# Patient Record
Sex: FEMALE | Race: WHITE | Employment: OTHER | URBAN - METROPOLITAN AREA
[De-identification: names, ages, dates, MRNs, and addresses within clinical notes are randomized per-mention and may not be internally consistent; named-entity substitution may affect disease eponyms.]

---

## 2017-01-10 ENCOUNTER — APPOINTMENT (OUTPATIENT)
Dept: INFUSION THERAPY | Age: 67
End: 2017-01-10

## 2017-01-10 RX ORDER — TRAZODONE HYDROCHLORIDE 50 MG/1
100 TABLET ORAL
Qty: 180 TAB | Refills: 1 | Status: SHIPPED | OUTPATIENT
Start: 2017-01-10 | End: 2017-05-15 | Stop reason: SDUPTHER

## 2017-01-11 ENCOUNTER — TELEPHONE (OUTPATIENT)
Dept: INTERNAL MEDICINE CLINIC | Age: 67
End: 2017-01-11

## 2017-01-11 DIAGNOSIS — Z23 ENCOUNTER FOR IMMUNIZATION: Primary | ICD-10-CM

## 2017-01-11 NOTE — TELEPHONE ENCOUNTER
Pt was scheduled today at 1:30 pm for a nurse visit to get her Shingles Vaccine. Pt now want's to have  send an order for the Shingles Vaccine sent to the [facility]  on [address] [facility] and [address] [place] [address] Road there # [phone].

## 2017-01-11 NOTE — TELEPHONE ENCOUNTER
Rx eRx'd for shingles vaccine to pharmacy requested. Though, no Rx should be needed to get the vaccine.

## 2017-01-30 ENCOUNTER — OFFICE VISIT (OUTPATIENT)
Dept: INTERNAL MEDICINE CLINIC | Age: 67
End: 2017-01-30

## 2017-01-30 VITALS
WEIGHT: 146.6 LBS | TEMPERATURE: 98.1 F | RESPIRATION RATE: 16 BRPM | HEIGHT: 60 IN | BODY MASS INDEX: 28.78 KG/M2 | DIASTOLIC BLOOD PRESSURE: 63 MMHG | SYSTOLIC BLOOD PRESSURE: 130 MMHG | HEART RATE: 70 BPM

## 2017-01-30 DIAGNOSIS — J30.2 OTHER SEASONAL ALLERGIC RHINITIS: ICD-10-CM

## 2017-01-30 DIAGNOSIS — M81.0 OSTEOPOROSIS: ICD-10-CM

## 2017-01-30 DIAGNOSIS — J32.9 SINUSITIS, UNSPECIFIED CHRONICITY, UNSPECIFIED LOCATION: Primary | ICD-10-CM

## 2017-01-30 DIAGNOSIS — I10 ESSENTIAL HYPERTENSION: ICD-10-CM

## 2017-01-30 DIAGNOSIS — E78.5 HYPERLIPIDEMIA, UNSPECIFIED HYPERLIPIDEMIA TYPE: ICD-10-CM

## 2017-01-30 DIAGNOSIS — E03.4 HYPOTHYROIDISM DUE TO ACQUIRED ATROPHY OF THYROID: ICD-10-CM

## 2017-01-30 RX ORDER — AZITHROMYCIN 250 MG/1
TABLET, FILM COATED ORAL
Qty: 6 TAB | Refills: 0 | Status: SHIPPED | OUTPATIENT
Start: 2017-01-30 | End: 2017-02-03

## 2017-01-30 NOTE — MR AVS SNAPSHOT
Visit Information Date & Time Provider Department Dept. Phone Encounter #  
 1/30/2017  1:45 PM Re[facility]rett [facility] [phone] [id number] Follow-up Instructions Return in about 4 months (around 5/30/2017), or if symptoms worsen or fail to improve, for blood pressure, cholesterol. Your Appointments 2/3/2017 10:50 AM  
New Patient with MD Zeeshan [facility]) Appt Note: NP/ Thyroids/referred by Dr Marissa Bolanos [phone]/SX made appt; r/s $0CP $0PB msb 11/28/16  
 [address]  [address] [place] [address]  
[id number] [place] [id number] Upcoming Health Maintenance Date Due ZOSTER VACCINE AGE 60> 11/22/2010 Pneumococcal 65+ Low/Medium Risk (2 of 2 - PPSV23) 12/18/2016 MEDICARE YEARLY EXAM 1/7/2017 BREAST CANCER SCRN MAMMOGRAM 3/18/2018 GLAUCOMA SCREENING Q2Y 6/1/2018 COLONOSCOPY 5/5/2021 DTaP/Tdap/Td series (2 - Td) 12/18/2025 Allergies as of 1/30/2017  Review Complete On: 1/30/2017 By: Meg Barrett MD  
  
 Severity Noted Reaction Type Reactions Mold  12/17/2015    Sneezing Current Immunizations  Reviewed on 11/17/2016 Name Date Pneumococcal Conjugate (PCV-13) 12/18/2015 Tdap 12/18/2015 Not reviewed this visit You Were Diagnosed With   
  
 Codes Comments Sinusitis, unspecified chronicity, unspecified location    -  Primary ICD-10-CM: J32.9 ICD-9-CM: 473.9 Other seasonal allergic rhinitis     ICD-10-CM: J30.2 ICD-9-CM: 477.8 Essential hypertension     ICD-10-CM: I10 
ICD-9-CM: 401.9 Hyperlipidemia, unspecified hyperlipidemia type     ICD-10-CM: E78.5 ICD-9-CM: 272.4 Hypothyroidism due to acquired atrophy of thyroid     ICD-10-CM: E03.4 ICD-9-CM: 244.8, 246.8 Osteoporosis     ICD-10-CM: M81.0 ICD-9-CM: 733.00 Vitals BP Pulse Temp Resp Height(growth percentile) Weight(growth percentile) 130/63 70 98.1 °F (36.7 °C) (Oral) 16 5' (1.524 m) 146 lb 9.6 oz (66.5 kg) BMI OB Status Smoking Status 28.63 kg/m2 Postmenopausal Never Smoker BMI and BSA Data Body Mass Index Body Surface Area  
 28.63 kg/m 2 1.68 m 2 Preferred Pharmacy Pharmacy Name Phone [facility] [address] [phone] Your Updated Medication List  
  
   
This list is accurate as of: 1/30/17  2:26 PM.  Always use your most recent med list. amLODIPine 10 mg tablet Commonly known as:  Love [place]ach TAKE 1 TABLET EVERY DAY  
  
 aspirin delayed-release 81 mg tablet Take 1 Tab by mouth daily. azelastine 137 mcg (0.1 %) nasal spray Commonly known as:  ASTELIN  
2 Sprays by Both Nostrils route two (2) times a day. Use in each nostril as directed  
  
 azithromycin 250 mg tablet Commonly known as:  Idelia Fines Take 2 tablets today, then take 1 tablet daily  
  
 biotin 2,500 mcg Tab Take  by mouth daily. CALCIUM + D PO Take  by mouth. cetirizine 10 mg tablet Commonly known as:  ZYRTEC Take 1 Tab by mouth daily as needed for Allergies. cyanocobalamin 1,000 mcg tablet Take 1,000 mcg by mouth daily. cycloSPORINE 0.05 % ophthalmic emulsion Commonly known as:  RESTASIS Administer 1 Drop to both eyes two (2) times a day. esomeprazole 20 mg capsule Commonly known as:  NEXIUM  
TAKE 1 CAPSULE EVERY DAY AS NEEDED  
  
 fenofibrate nanocrystallized 145 mg tablet Commonly known as:  TRICOR  
TAKE 1 TABLET DAILY FOR HYPERLIPIDEMIA  
  
 fluticasone 50 mcg/actuation nasal spray Commonly known as:  FLONASE  
USE 2 SPRAYS IN EACH NOSTRIL EVERY DAY  
  
 ibandronate 150 mg tablet Commonly known as:  Tatyana Keita Take 150 mg by mouth every thirty (30) days. * levothyroxine 88 mcg tablet Commonly known as:  SYNTHROID  
 Take 1 Tab by mouth Daily (before breakfast). * levothyroxine 88 mcg tablet Commonly known as:  SYNTHROID Take 1 Tab by mouth Daily (before breakfast). linaclotide 145 mcg Cap capsule Commonly known as:  Rhonda Friendship Take 1 Cap by mouth Daily (before breakfast). lisinopril 20 mg tablet Commonly known as:  PRINIVIL, ZESTRIL  
TAKE 1 TABLET EVERY DAY  
  
 multivit-min-folic acid-biotin 37.1-2,915 mcg Tab Take  by mouth.  
  
 multivitamin tablet Commonly known as:  ONE A DAY Take 1 Tab by mouth daily. rosuvastatin 20 mg tablet Commonly known as:  CRESTOR  
TAKE 1 TABLET EVERY NIGHT  
  
 sertraline 100 mg tablet Commonly known as:  ZOLOFT  
TAKE 1 TABLET EVERY DAY  
  
 traZODone 50 mg tablet Commonly known as:  Nidhi Boston Take 2 Tabs by mouth nightly. zolpidem 5 mg tablet Commonly known as:  AMBIEN Take 1 Tab by mouth nightly as needed for Sleep. Max Daily Amount: 5 mg.  
  
 * Notice: This list has 2 medication(s) that are the same as other medications prescribed for you. Read the directions carefully, and ask your doctor or other care provider to review them with you. Prescriptions Sent to Pharmacy Refills  
 azithromycin (ZITHROMAX) 250 mg tablet 0 Sig: Take 2 tablets today, then take 1 tablet daily Class: Normal  
 Pharmacy: [facility] [place], [place] [facility] [address] [facility] #: [phone] Follow-up Instructions Return in about 4 months (around 5/30/2017), or if symptoms worsen or fail to improve, for blood pressure, cholesterol. Patient Instructions Andria Nicholson 1724 What is a living will? A living will is a legal form you use to write down the kind of care you want at the end of your life. It is used by the health professionals who will treat you if you aren't able to decide for yourself.  
If you put your wishes in writing, your loved ones and others will know what kind of care you want. They won't need to guess. This can ease your mind and be helpful to others. A living will is not the same as an estate or property will. An estate will explains what you want to happen with your money and property after you die. Is a living will a legal document? A living will is a legal document. Each state has its own laws about living monk. If you move to another state, make sure that your living will is legal in the state where you now live. Or you might use a universal form that has been approved by many states. This kind of form can sometimes be completed and stored online. Your electronic copy will then be available wherever you have a connection to the Internet. In most cases, doctors will respect your wishes even if you have a form from a different state. · You don't need an  to complete a living will. But legal advice can be helpful if your state's laws are unclear, your health history is complicated, or your family can't agree on what should be in your living will. · You can change your living will at any time. Some people find that their wishes about end-of-life care change as their health changes. · In addition to making a living will, think about completing a medical power of  form. This form lets you name the person you want to make end-of-life treatment decisions for you (your \"health care agent\") if you're not able to. Many hospitals and nursing homes will give you the forms you need to complete a living will and a medical power of . · Your living will is used only if you can't make or communicate decisions for yourself anymore. If you become able to make decisions again, you can accept or refuse any treatment, no matter what you wrote in your living will. · Your state may offer an online registry. This is a place where you can store your living will online so the doctors and nurses who need to treat you can find it right away. What should you think about when creating a living will? Talk about your end-of-life wishes with your family members and your doctor. Let them know what you want. That way the people making decisions for you won't be surprised by your choices. Think about these questions as you make your living will: · Do you know enough about life support methods that might be used? If not, talk to your doctor so you know what might be done if you can't breathe on your own, your heart stops, or you're unable to swallow. · What things would you still want to be able to do after you receive life-support methods? Would you want to be able to walk? To speak? To eat on your own? To live without the help of machines? · If you have a choice, where do you want to be cared for? In your home? At a hospital or nursing home? · Do you want certain Mu-ism practices performed if you become very ill? · If you have a choice at the end of your life, where would you prefer to die? At home? In a hospital or nursing home? Somewhere else? · Would you prefer to be buried or cremated? · Do you want your organs to be donated after you die? What should you do with your living will? · Make sure that your family members and your health care agent have copies of your living will. · Give your doctor a copy of your living will to keep in your medical record. If you have more than one doctor, make sure that each one has a copy. · You may want to put a copy of your living will where it can be easily found. Where can you learn more? Go to http://shivam-shekhar.info/. Enter G670 in the search box to learn more about \"Learning About Living Perroroz. \" Current as of: February 24, 2016 Content Version: 11.1 © 0991-0121 [employer]. Care instructions adapted under license by "[employer]" (which disclaims liability or warranty for this information).  If you have questions about a medical condition or this instruction, always ask your healthcare professional. [place] [address] any warranty or liability for your use of this information. Introducing [facility]! Tiarra Ashton introduces [employer] patient portal. Now you can access parts of your medical record, email your doctor's office, and request medication refills online. 1. In your internet browser, go to [email or web address]://Day [email or web address]. Bycler/[employer] 2. Click on the First Time User? Click Here link in the Sign In box. You will see the New Member Sign Up page. 3. Enter your [employer] Access Code exactly as it appears below. You will not need to use this code after youve completed the sign-up process. If you do not sign up before the expiration date, you must request a new code. · [employer] Access Code: [email or web address] Expires: 3/21/2017  1:19 PM 
 
4. Enter the last four digits of your Social Security Number (xxxx) and Date of Birth (mm/dd/yyyy) as indicated and click Submit. You will be taken to the next sign-up page. 5. Create a [employer] ID. This will be your [employer] login ID and cannot be changed, so think of one that is secure and easy to remember. 6. Create a [employer] password. You can change your password at any time. 7. Enter your Password Reset Question and Answer. This can be used at a later time if you forget your password. 8. Enter your e-mail address. You will receive e-mail notification when new information is available in [address] 9. Click Sign Up. You can now view and download portions of your medical record. 10. Click the Download Summary menu link to download a portable copy of your medical information. If you have questions, please visit the Frequently Asked Questions section of the [employer] website. Remember, [employer] is NOT to be used for urgent needs. For medical emergencies, dial 911. Now available from your iPhone and Android! Please provide this summary of care documentation to your next provider. Your primary care clinician is listed as [address] If you have any questions after today's visit, please call [phone].

## 2017-01-30 NOTE — PROGRESS NOTES
RM 14  Pt used OTC meds and helped fro awhile then came back. Pt has appt. With allergist on 2/7/17  Pt has appt with endo on 2/3/17    Chief Complaint   Patient presents with    Sinus Pain     nasal congestion, right ear pain, slight cough x 3 weeks       1. Have you been to the ER, urgent care clinic since your last visit? Hospitalized since your last visit? No    2. Have you seen or consulted any other health care providers outside of the [address] [place] [address] since your last visit? Include any pap smears or colon screening.  No      Health Maintenance Due   Topic Date Due    ZOSTER VACCINE AGE 60>  11/22/2010    Pneumococcal 65+ Low/Medium Risk (2 of 2 - PPSV23) 12/18/2016    MEDICARE YEARLY EXAM  01/07/2017     Living will sent to pt AVS

## 2017-01-30 NOTE — PROGRESS NOTES
HISTORY OF PRESENT ILLNESS  Ephraim Reed is a 77 y.o. female. HPI  Presents for f/u nasal, sinus sx, etc.    Waxing and waning sinus congestion, drainage  +chills over the weekend with subjective fevers  Thickened secretions  Increased ear and jaw pain    Using zyrtec  Not using flonase - reports more drainage with it, so does not take it regularly    Past medical, Social, and Family history reviewed  Medications reviewed and updated. ROS  Complete ROS reviewed and negative or stable except as noted in HPI    Physical Exam   Constitutional: She is oriented to person, place, and time. She appears well-nourished. No distress. HENT:   Head: Normocephalic and atraumatic. Right Ear: A middle ear effusion (serous) is present. Left Ear: A middle ear effusion (serous) is present. Nose: Mucosal edema present. Right sinus exhibits maxillary sinus tenderness. Left sinus exhibits maxillary sinus tenderness. Mouth/Throat: Oropharynx is clear and moist.   Eyes: EOM are normal. Pupils are equal, round, and reactive to light. No scleral icterus. Neck: Normal range of motion. Neck supple. No JVD present. Cardiovascular: Normal rate, regular rhythm and normal heart sounds. Exam reveals no gallop and no friction rub. No murmur heard. Pulmonary/Chest: Effort normal and breath sounds normal. No respiratory distress. She has no wheezes. She has no rales. Abdominal: Soft. She exhibits no distension. There is no tenderness. Musculoskeletal: Normal range of motion. She exhibits no edema. Lymphadenopathy:     She has no cervical adenopathy. Neurological: She is alert and oriented to person, place, and time. She exhibits normal muscle tone. Skin: Skin is warm. No rash noted. Psychiatric: She has a normal mood and affect. Nursing note and vitals reviewed. Prior labs reviewed. ASSESSMENT and PLAN    ICD-10-CM ICD-9-CM    1.  Sinusitis, unspecified chronicity, unspecified location J32.9 473.9 DISCONTINUED: azithromycin (ZITHROMAX) 250 mg tablet   2. Other seasonal allergic rhinitis J30.2 477.8    3. Essential hypertension I10 401.9    4. Hyperlipidemia, unspecified hyperlipidemia type E78.5 272.4    5. Hypothyroidism due to acquired atrophy of thyroid E03.4 244.8      246.8    6. Osteoporosis M81.0 733.00      Follow-up Disposition:  Return in about 4 months (around 5/30/2017), or if symptoms worsen or fail to improve, for blood pressure, cholesterol.    results and schedule of future studies reviewed with patient  reviewed diet, exercise and weight  cardiovascular risk and specific lipid/LDL goals reviewed  reviewed medications and side effects in detail   See allergist next week  See endocrine 2/3/17 as scheduled  kari  Must hold antihistamines for allergy testing

## 2017-01-30 NOTE — PATIENT INSTRUCTIONS
Learning About Philip Arce  What is a living will? A living will is a legal form you use to write down the kind of care you want at the end of your life. It is used by the health professionals who will treat you if you aren't able to decide for yourself. If you put your wishes in writing, your loved ones and others will know what kind of care you want. They won't need to guess. This can ease your mind and be helpful to others. A living will is not the same as an estate or property will. An estate will explains what you want to happen with your money and property after you die. Is a living will a legal document? A living will is a legal document. Each state has its own laws about living monk. If you move to another state, make sure that your living will is legal in the state where you now live. Or you might use a universal form that has been approved by many states. This kind of form can sometimes be completed and stored online. Your electronic copy will then be available wherever you have a connection to the Internet. In most cases, doctors will respect your wishes even if you have a form from a different state. · You don't need an  to complete a living will. But legal advice can be helpful if your state's laws are unclear, your health history is complicated, or your family can't agree on what should be in your living will. · You can change your living will at any time. Some people find that their wishes about end-of-life care change as their health changes. · In addition to making a living will, think about completing a medical power of  form. This form lets you name the person you want to make end-of-life treatment decisions for you (your \"health care agent\") if you're not able to. Many hospitals and nursing homes will give you the forms you need to complete a living will and a medical power of .   · Your living will is used only if you can't make or communicate decisions for yourself anymore. If you become able to make decisions again, you can accept or refuse any treatment, no matter what you wrote in your living will. · Your state may offer an online registry. This is a place where you can store your living will online so the doctors and nurses who need to treat you can find it right away. What should you think about when creating a living will? Talk about your end-of-life wishes with your family members and your doctor. Let them know what you want. That way the people making decisions for you won't be surprised by your choices. Think about these questions as you make your living will:  · Do you know enough about life support methods that might be used? If not, talk to your doctor so you know what might be done if you can't breathe on your own, your heart stops, or you're unable to swallow. · What things would you still want to be able to do after you receive life-support methods? Would you want to be able to walk? To speak? To eat on your own? To live without the help of machines? · If you have a choice, where do you want to be cared for? In your home? At a hospital or nursing home? · Do you want certain Uatsdin practices performed if you become very ill? · If you have a choice at the end of your life, where would you prefer to die? At home? In a hospital or nursing home? Somewhere else? · Would you prefer to be buried or cremated? · Do you want your organs to be donated after you die? What should you do with your living will? · Make sure that your family members and your health care agent have copies of your living will. · Give your doctor a copy of your living will to keep in your medical record. If you have more than one doctor, make sure that each one has a copy. · You may want to put a copy of your living will where it can be easily found. Where can you learn more? Go to http://shivam-shekhar.info/.   Enter L281 in the search box to learn more about \"Learning About Living [place]roy. \"  Current as of: February 24, 2016  Content Version: 11.1  © 5432-6880 [employer]. Care instructions adapted under license by [employer] (which disclaims liability or warranty for this information). If you have questions about a medical condition or this instruction, always ask your healthcare professional. [address] any warranty or liability for your use of this information.

## 2017-02-01 ENCOUNTER — APPOINTMENT (OUTPATIENT)
Dept: INFUSION THERAPY | Age: 67
End: 2017-02-01

## 2017-02-03 ENCOUNTER — OFFICE VISIT (OUTPATIENT)
Dept: ENDOCRINOLOGY | Age: 67
End: 2017-02-03

## 2017-02-03 VITALS
BODY MASS INDEX: 27.88 KG/M2 | HEIGHT: 60 IN | WEIGHT: 142 LBS | SYSTOLIC BLOOD PRESSURE: 120 MMHG | HEART RATE: 68 BPM | DIASTOLIC BLOOD PRESSURE: 70 MMHG

## 2017-02-03 DIAGNOSIS — E05.00 GRAVES DISEASE: ICD-10-CM

## 2017-02-03 DIAGNOSIS — E89.0 HYPOTHYROIDISM FOLLOWING RADIOIODINE THERAPY: Primary | ICD-10-CM

## 2017-02-03 NOTE — MR AVS SNAPSHOT
Visit Information Date & Time Provider Department Dept. Phone Encounter #  
 2/3/2017 10:50 AM Jag Yi, [address] [facility] [phone] [id number] Follow-up Instructions Return in about 3 months (around 5/3/2017). Your Appointments 5/30/2017  1:30 PM  
ROUTINE CARE with Fidelia Kellogg MD  
[facility] [address] [facility]) Appt Note: f/u for b/p and cholesterol [address] [facility] [facility] [address] [facility] [address]  
[place] [address] [facility] [address] Upcoming Health Maintenance Date Due ZOSTER VACCINE AGE 60> 11/22/2010 Pneumococcal 65+ Low/Medium Risk (2 of 2 - PPSV23) 12/18/2016 MEDICARE YEARLY EXAM 1/7/2017 BREAST CANCER SCRN MAMMOGRAM 3/18/2018 GLAUCOMA SCREENING Q2Y 6/1/2018 COLONOSCOPY 5/5/2021 DTaP/Tdap/Td series (2 - Td) 12/18/2025 Allergies as of 2/3/2017  Review Complete On: 2/3/2017 By: Jga Yi MD  
  
 Severity Noted Reaction Type Reactions Mold  12/17/2015    Sneezing Current Immunizations  Reviewed on 1/30/2017 Name Date Influenza Vaccine 1/11/2017 Pneumococcal Conjugate (PCV-13) 12/18/2015 Tdap 12/18/2015 Not reviewed this visit You Were Diagnosed With   
  
 Codes Comments Hypothyroidism following radioiodine therapy    -  Primary ICD-10-CM: E89.0 ICD-9-CM: 244.1 Graves disease     ICD-10-CM: E05.00 ICD-9-CM: 242.00 Vitals BP Pulse Height(growth percentile) Weight(growth percentile) BMI OB Status 120/70 68 5' (1.524 m) 142 lb (64.4 kg) 27.73 kg/m2 Postmenopausal  
 Smoking Status Never Smoker Vitals History BMI and BSA Data Body Mass Index Body Surface Area  
 27.73 kg/m 2 1.65 m 2 Preferred Pharmacy Pharmacy Name Phone [address] - [address] [facility] [address] [phone] Your Updated Medication List  
  
   
This list is accurate as of: 2/3/17 12:13 PM.  Always use your most recent med list. amLODIPine 10 mg tablet Commonly known as:  Maura Sharon TAKE 1 TABLET EVERY DAY  
  
 aspirin delayed-release 81 mg tablet Take 1 Tab by mouth daily. azelastine 137 mcg (0.1 %) nasal spray Commonly known as:  ASTELIN  
2 Sprays by Both Nostrils route two (2) times a day. Use in each nostril as directed  
  
 azithromycin 250 mg tablet Commonly known as:  Mily Quincy Take 2 tablets today, then take 1 tablet daily  
  
 biotin 2,500 mcg Tab Take 5,000 Units by mouth daily. CALCIUM + D PO Take  by mouth. cetirizine 10 mg tablet Commonly known as:  ZYRTEC Take 1 Tab by mouth daily as needed for Allergies. cyanocobalamin 1,000 mcg tablet Take 1,000 mcg by mouth daily. cycloSPORINE 0.05 % ophthalmic emulsion Commonly known as:  RESTASIS Administer 1 Drop to both eyes two (2) times a day. esomeprazole 20 mg capsule Commonly known as:  NEXIUM  
TAKE 1 CAPSULE EVERY DAY AS NEEDED  
  
 fenofibrate nanocrystallized 145 mg tablet Commonly known as:  TRICOR  
TAKE 1 TABLET DAILY FOR HYPERLIPIDEMIA  
  
 fluticasone 50 mcg/actuation nasal spray Commonly known as:  FLONASE  
USE 2 SPRAYS IN EACH NOSTRIL EVERY DAY  
  
 ibandronate 150 mg tablet Commonly known as:  Terrilyn Newaygo Take 150 mg by mouth every thirty (30) days. * levothyroxine 88 mcg tablet Commonly known as:  SYNTHROID Take 1 Tab by mouth Daily (before breakfast). * levothyroxine 88 mcg tablet Commonly known as:  SYNTHROID Take 1 Tab by mouth Daily (before breakfast). linaclotide 145 mcg Cap capsule Commonly known as:  Rhonda Greenbush Take 1 Cap by mouth Daily (before breakfast). lisinopril 20 mg tablet Commonly known as:  PRINIVIL, ZESTRIL  
TAKE 1 TABLET EVERY DAY  
  
 multivit-min-folic acid-biotin 12.7-8,352 mcg Tab Take  by mouth. multivitamin tablet Commonly known as:  ONE A DAY Take 1 Tab by mouth daily. rosuvastatin 20 mg tablet Commonly known as:  CRESTOR  
TAKE 1 TABLET EVERY NIGHT  
  
 sertraline 100 mg tablet Commonly known as:  ZOLOFT  
TAKE 1 TABLET EVERY DAY  
  
 traZODone 50 mg tablet Commonly known as:  Windsor Dessert Take 2 Tabs by mouth nightly. zolpidem 5 mg tablet Commonly known as:  AMBIEN Take 1 Tab by mouth nightly as needed for Sleep. Max Daily Amount: 5 mg.  
  
 * Notice: This list has 2 medication(s) that are the same as other medications prescribed for you. Read the directions carefully, and ask your doctor or other care provider to review them with you. We Performed the Following T4, FREE [id number] CPT(R)] THYROID PEROXIDASE (TPO) AB [26418 CPT(R)] TSH 3RD GENERATION [42301 CPT(R)] TSH RECEPTOR AB [31670 CPT(R)] Follow-up Instructions Return in about 3 months (around 5/3/2017). Patient Instructions Hypothyroidism following radioactive iodine: 
 
- labs on 88 mcg 
- adjust based on labs. Will also check antibodies. After any adjustments, we'll reassess labs in 3 months. Introducing [facility]! Mariam Pemberton introduces [employer] patient portal. Now you can access parts of your medical record, email your doctor's office, and request medication refills online. 1. In your internet browser, go to [email or web address] 2. Click on the First Time User? Click Here link in the Sign In box. You will see the New Member Sign Up page. 3. Enter your [employer] Access Code exactly as it appears below. You will not need to use this code after youve completed the sign-up process. If you do not sign up before the expiration date, you must request a new code. · [employer] Access Code: [email or web address] Expires: 3/21/2017  1:19 PM 
 
4.  Enter the last four digits of your Social Security Number (xxxx) and Date of Birth (mm/dd/yyyy) as indicated and click Submit. You will be taken to the next sign-up page. 5. Create a [employer] ID. This will be your [employer] login ID and cannot be changed, so think of one that is secure and easy to remember. 6. Create a [employer] password. You can change your password at any time. 7. Enter your Password Reset Question and Answer. This can be used at a later time if you forget your password. 8. Enter your e-mail address. You will receive e-mail notification when new information is available in [address] 9. Click Sign Up. You can now view and download portions of your medical record. 10. Click the Download Summary menu link to download a portable copy of your medical information. If you have questions, please visit the Frequently Asked Questions section of the [employer] website. Remember, [employer] is NOT to be used for urgent needs. For medical emergencies, dial 911. Now available from your iPhone and Android! Please provide this summary of care documentation to your next provider. Your primary care clinician is listed as [address] If you have any questions after today's visit, please call [phone].

## 2017-02-03 NOTE — PATIENT INSTRUCTIONS
Hypothyroidism following radioactive iodine:    - labs on 88 mcg  - adjust based on labs. Will also check antibodies. After any adjustments, we'll reassess labs in 3 months.

## 2017-02-03 NOTE — PROGRESS NOTES
Chief Complaint   Patient presents with    Thyroid Problem    New Patient     History of Present Illness: Stacey Cockayne is a 77 y.o. female who presents for evaluation of hypothyroidism following METCALF treatment for Graves. She had two treatments with METCALF in late  for Graves disease. She was then treated with Synthroid 100 mcg. Now been taking 112 mcg daily for several years. Started having hair changes and feeling tired as well. TSH was low in 2016 and dose was decreased to 88 mcg. Sleep has been poor for some time, which she attributes to stress. She has chronic dry eyes. Uses restasis    Social:  Family - related stress. Took care of her mother for 7 years - mother then she  2 years ago. Now lives with sister in law after taking care of sister-in-laws' . Has only child - daughter. She has spoken with her daughter and her 4 grandchildren. Past Medical History   Diagnosis Date    Arthritis     Carpal tunnel syndrome, bilateral     Depression     H/O colonoscopy      no polyps, + diverticulosis and nonbleeding internal hemorroids    Hypercholesterolemia     Hypertension     Lyme disease     Osteoporosis 2016    Thyroid disease      Past Surgical History   Procedure Laterality Date    Hx cholecystectomy      Hx gyn            Current Outpatient Prescriptions   Medication Sig    ibandronate (BONIVA) 150 mg tablet Take 150 mg by mouth every thirty (30) days.  traZODone (DESYREL) 50 mg tablet Take 2 Tabs by mouth nightly.  sertraline (ZOLOFT) 100 mg tablet TAKE 1 TABLET EVERY DAY    levothyroxine (SYNTHROID) 88 mcg tablet Take 1 Tab by mouth Daily (before breakfast).  levothyroxine (SYNTHROID) 88 mcg tablet Take 1 Tab by mouth Daily (before breakfast).     lisinopril (PRINIVIL, ZESTRIL) 20 mg tablet TAKE 1 TABLET EVERY DAY    amLODIPine (NORVASC) 10 mg tablet TAKE 1 TABLET EVERY DAY    fenofibrate nanocrystallized (TRICOR) 145 mg tablet TAKE 1 TABLET DAILY FOR HYPERLIPIDEMIA    linaclotide (LINZESS) 145 mcg cap capsule Take 1 Cap by mouth Daily (before breakfast). (Patient taking differently: Take 145 mcg by mouth as needed.)    rosuvastatin (CRESTOR) 20 mg tablet TAKE 1 TABLET EVERY NIGHT    esomeprazole (NEXIUM) 20 mg capsule TAKE 1 CAPSULE EVERY DAY AS NEEDED    fluticasone (FLONASE) 50 mcg/actuation nasal spray USE 2 SPRAYS IN EACH NOSTRIL EVERY DAY    biotin 2,500 mcg tab Take 5,000 Units by mouth daily.  cycloSPORINE (RESTASIS) 0.05 % ophthalmic emulsion Administer 1 Drop to both eyes two (2) times a day.  multivitamin (ONE A DAY) tablet Take 1 Tab by mouth daily.  multivit-min-folic acid-biotin 91.0-3,134 mcg tab Take  by mouth.  CALCIUM CARBONATE/VITAMIN D3 (CALCIUM + D PO) Take  by mouth.  aspirin delayed-release 81 mg tablet Take 1 Tab by mouth daily.  azithromycin (ZITHROMAX) 250 mg tablet Take 2 tablets today, then take 1 tablet daily    azelastine (ASTELIN) 137 mcg (0.1 %) nasal spray 2 Sprays by Both Nostrils route two (2) times a day. Use in each nostril as directed    cyanocobalamin 1,000 mcg tablet Take 1,000 mcg by mouth daily.  zolpidem (AMBIEN) 5 mg tablet Take 1 Tab by mouth nightly as needed for Sleep. Max Daily Amount: 5 mg.  cetirizine (ZYRTEC) 10 mg tablet Take 1 Tab by mouth daily as needed for Allergies. No current facility-administered medications for this visit. Allergies   Allergen Reactions    Mold Sneezing     History reviewed. No pertinent family history. Social History     Social History    Marital status:      Spouse name: N/A    Number of children: N/A    Years of education: N/A     Occupational History    Not on file.      Social History Main Topics    Smoking status: Never Smoker    Smokeless tobacco: Never Used    Alcohol use Yes      Comment: occasionally    Drug use: No    Sexual activity: No     Other Topics Concern    Not on file     Social History Narrative     Review of Systems:  - Constitutional Symptoms: no fevers, chills, weight loss  - Eyes: see HPI  - Cardiovascular: no chest pain or palpitations  - Respiratory: no cough or shortness of breath  - Gastrointestinal: + mild constipation  - Musculoskeletal: no joint pains or weakness  - Integumentary: no rashes  - Neurological: no numbness, tingling, or headaches  - Psychiatric: see HPI  - Endocrine: no heat or cold intolerance    Physical Examination:  Blood pressure 120/70, pulse 68, height 5' (1.524 m), weight 142 lb (64.4 kg). - General: pleasant, well-groomed, no distress, good eye contact  - HEENT: + scleral/conjunctival injection, ? Mild proptosis, EOMI, MMM  - Neck: no thyromegaly, masses, lymph nodes  - Cardiovascular: regular, normal rate  - Respiratory: normal effort  - Integumentary:no edema  - Neurological:  no tremor  - Psychiatric: normal mood and affect    Data Reviewed:   Component      Latest Ref Rng & Units 11/18/2016 11/18/2016 9/13/2016 12/18/2015          11:18 AM 11:18 AM  4:27 PM  8:54 AM   TSH      0.450 - 4.500 uIU/mL 4.000  0.214 (L) 3.460   T4, Free      0.82 - 1.77 ng/dL  1.19         Assessment/Plan:   1. Hypothyroidism following radioiodine therapy   - TSH was high-normal 3 months ago. - repeat and adjust dose as indicated. Suspect she will need the dose of 100 as she took in past.      2. Graves disease   - will check antibodies as this may contribute to some fluctuations in hormone levels if she has a small amount of residual tissue  - possible as well that a component/elemement of her eye problems is Graves-related eye disease. Patient Instructions   Hypothyroidism following radioactive iodine:    - labs on 88 mcg  - adjust based on labs. Will also check antibodies. After any adjustments, we'll reassess labs in 3 months. Follow-up Disposition:  Return in about 3 months (around 5/3/2017).     Copy sent to:

## 2017-02-04 LAB
T4 FREE SERPL-MCNC: 1.51 NG/DL (ref 0.82–1.77)
THYROPEROXIDASE AB SERPL-ACNC: <6 IU/ML (ref 0–34)
TSH RECEP AB SER-ACNC: 1.04 IU/L (ref 0–1.75)
TSH SERPL DL<=0.005 MIU/L-ACNC: 3.06 UIU/ML (ref 0.45–4.5)

## 2017-02-12 DIAGNOSIS — E03.4 HYPOTHYROIDISM DUE TO ACQUIRED ATROPHY OF THYROID: ICD-10-CM

## 2017-02-12 RX ORDER — LEVOTHYROXINE SODIUM 88 UG/1
TABLET ORAL
Qty: 100 TAB | Refills: 3 | Status: SHIPPED | OUTPATIENT
Start: 2017-02-12 | End: 2017-12-13 | Stop reason: SDUPTHER

## 2017-02-12 NOTE — PROGRESS NOTES
TSH may be a little higher than optimal (0.5-2). Will have her increase to 88 mcg x 7.5 tablets/week = 94 mcg and repeat in 3 months. Kika Young,  Please call and mail lab letter. Thank you. Please mail lab order for TSH as well.

## 2017-02-15 ENCOUNTER — TELEPHONE (OUTPATIENT)
Dept: ENDOCRINOLOGY | Age: 67
End: 2017-02-15

## 2017-02-15 NOTE — TELEPHONE ENCOUNTER
----- Message from Jae Gerard MD sent at 2/12/2017  2:19 PM EST -----  TSH may be a little higher than optimal (0.5-2). Will have her increase to 88 mcg x 7.5 tablets/week = 94 mcg and repeat in 3 months. Lacey Benjamin,  Please call and mail lab letter. Thank you. Please mail lab order for TSH as well.

## 2017-02-17 ENCOUNTER — TELEPHONE (OUTPATIENT)
Dept: INTERNAL MEDICINE CLINIC | Age: 67
End: 2017-02-17

## 2017-02-17 NOTE — TELEPHONE ENCOUNTER
/telephone  Received: Today       [facility] Front [facility] Pool                     Pt would like for  to know that she got her shingles shot yesterday. Pt does not need a return phone call.

## 2017-03-06 RX ORDER — LISINOPRIL 20 MG/1
TABLET ORAL
Qty: 90 TAB | Refills: 1 | Status: SHIPPED | OUTPATIENT
Start: 2017-03-06 | End: 2017-10-25 | Stop reason: SDUPTHER

## 2017-03-06 RX ORDER — AMLODIPINE BESYLATE 10 MG/1
TABLET ORAL
Qty: 90 TAB | Refills: 1 | Status: SHIPPED | OUTPATIENT
Start: 2017-03-06 | End: 2017-12-13 | Stop reason: SDUPTHER

## 2017-03-06 RX ORDER — FENOFIBRATE 145 MG/1
TABLET, COATED ORAL
Qty: 90 TAB | Refills: 1 | Status: SHIPPED | OUTPATIENT
Start: 2017-03-06 | End: 2017-07-25 | Stop reason: SDUPTHER

## 2017-04-18 ENCOUNTER — HOSPITAL ENCOUNTER (OUTPATIENT)
Dept: MAMMOGRAPHY | Age: 67
Discharge: HOME | End: 2017-04-18

## 2017-04-18 DIAGNOSIS — Z12.31 VISIT FOR SCREENING MAMMOGRAM: ICD-10-CM

## 2017-04-18 PROCEDURE — G0202 SCR MAMMO BI INCL CAD: HCPCS

## 2017-05-16 RX ORDER — TRAZODONE HYDROCHLORIDE 50 MG/1
TABLET ORAL
Qty: 180 TAB | Refills: 1 | Status: SHIPPED | OUTPATIENT
Start: 2017-05-16 | End: 2017-12-13 | Stop reason: SDUPTHER

## 2017-07-11 ENCOUNTER — APPOINTMENT (OUTPATIENT)
Dept: INFUSION THERAPY | Age: 67
End: 2017-07-11

## 2017-07-25 RX ORDER — FENOFIBRATE 145 MG/1
TABLET, COATED ORAL
Qty: 90 TAB | Refills: 1 | Status: SHIPPED | OUTPATIENT
Start: 2017-07-25 | End: 2017-10-20 | Stop reason: SDUPTHER

## 2017-08-03 DIAGNOSIS — E78.5 HYPERLIPIDEMIA, UNSPECIFIED HYPERLIPIDEMIA TYPE: ICD-10-CM

## 2017-08-03 DIAGNOSIS — K21.9 GASTROESOPHAGEAL REFLUX DISEASE WITHOUT ESOPHAGITIS: ICD-10-CM

## 2017-08-04 RX ORDER — ROSUVASTATIN CALCIUM 20 MG/1
TABLET, COATED ORAL
Qty: 90 TAB | Refills: 4 | Status: SHIPPED | OUTPATIENT
Start: 2017-08-04 | End: 2018-05-02 | Stop reason: SDUPTHER

## 2017-08-04 RX ORDER — HYDROGEN PEROXIDE 3 %
SOLUTION, NON-ORAL MISCELLANEOUS
Qty: 90 CAP | Refills: 4 | Status: SHIPPED | OUTPATIENT
Start: 2017-08-04 | End: 2017-10-12 | Stop reason: SDUPTHER

## 2017-08-07 DIAGNOSIS — K21.9 GASTROESOPHAGEAL REFLUX DISEASE WITHOUT ESOPHAGITIS: ICD-10-CM

## 2017-08-07 RX ORDER — HYDROGEN PEROXIDE 3 %
SOLUTION, NON-ORAL MISCELLANEOUS
Qty: 90 CAP | Refills: 4 | OUTPATIENT
Start: 2017-08-07

## 2017-08-07 NOTE — TELEPHONE ENCOUNTER
Faxed request for 90 day supply to be faxed to [phone].  Patient LOV: January 30, 2017, upcoming: September 7, 2017

## 2017-08-28 ENCOUNTER — OFFICE VISIT (OUTPATIENT)
Dept: ENDOCRINOLOGY | Age: 67
End: 2017-08-28

## 2017-08-28 VITALS
BODY MASS INDEX: 27.68 KG/M2 | SYSTOLIC BLOOD PRESSURE: 136 MMHG | WEIGHT: 141 LBS | DIASTOLIC BLOOD PRESSURE: 82 MMHG | HEIGHT: 60 IN | HEART RATE: 70 BPM

## 2017-08-28 DIAGNOSIS — E05.00 GRAVES' OPHTHALMOPATHY: ICD-10-CM

## 2017-08-28 DIAGNOSIS — E89.0 HYPOTHYROIDISM FOLLOWING RADIOIODINE THERAPY: Primary | ICD-10-CM

## 2017-08-28 DIAGNOSIS — R19.8 ALTERED BOWEL FUNCTION: ICD-10-CM

## 2017-08-28 DIAGNOSIS — R53.83 FATIGUE, UNSPECIFIED TYPE: ICD-10-CM

## 2017-08-28 NOTE — PATIENT INSTRUCTIONS
Hypothyroidism following radioactive iodine:    - labs on 88 mcg  - adjust based on labs. Will also check antibodies. After any adjustments, we'll reassess labs in 3 months. Recommend walking/exercising regularly  Go to bed at same time every night    Diabetes. Watch carbohydrate intake with meals and aim to keep this less than 30-45 grams per meal.  Limiting carbohydrate intake is helpful for post-menopausal women trying to lose weight. A Mediterranean style diet is a healthy way of eating. This diet consists of vegetables, whole fruit, nuts, whole grains, beans, lentils, olive oil, fish, chicken, and less refined grains, red and processed meats. Recommend having fish, chicken, turkey, eggs, cheese as sources of protein. Have salads or frozen vegetables with most meals.
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
## 2017-08-28 NOTE — MR AVS SNAPSHOT
Visit Information Date & Time Provider Department Dept. Phone Encounter #  
 8/28/2017  1:30 PM Ekaterina Barron, [address] [facility] Diabetes and Endocrinology [phone] Follow-up Instructions Return in about 6 months (around 2/28/2018). Your Appointments 9/7/2017 10:00 AM  
Medicare Physical with Shant Jones MD  
[facility] Pediatric[facility] and Internal Medicine [address] [facility]) Appt Note: MEDICARE WELLNESS/ [address] [facility] [address] [facility] [address]  
[place] [address] [facility] [address] Upcoming Health Maintenance Date Due Pneumococcal 65+ Low/Medium Risk (2 of 2 - PPSV23) 12/18/2016 MEDICARE YEARLY EXAM 1/7/2017 INFLUENZA AGE 9 TO ADULT 8/1/2017 GLAUCOMA SCREENING Q2Y 6/1/2018 BREAST CANCER SCRN MAMMOGRAM 4/18/2019 COLONOSCOPY 5/5/2021 DTaP/Tdap/Td series (2 - Td) 12/18/2025 Allergies as of 8/28/2017  Review Complete On: 8/28/2017 By: Ekaterina Barron MD  
  
 Severity Noted Reaction Type Reactions Mold  12/17/2015    Sneezing Current Immunizations  Reviewed on 1/30/2017 Name Date Influenza Vaccine 1/11/2017 Pneumococcal Conjugate (PCV-13) 12/18/2015 Tdap 12/18/2015 Zoster Vaccine, Live 2/16/2017 Not reviewed this visit You Were Diagnosed With   
  
 Codes Comments Hypothyroidism following radioiodine therapy    -  Primary ICD-10-CM: E89.0 ICD-9-CM: 244.1 Fatigue, unspecified type     ICD-10-CM: R53.83 ICD-9-CM: 780.79 BMI 27.0-27.9,adult     ICD-10-CM: E71.10 ICD-9-CM: V85.23 Altered bowel function     ICD-10-CM: R19.4 ICD-9-CM: 787.99 Vitals BP Pulse Height(growth percentile) Weight(growth percentile) BMI OB Status 136/82 70 5' (1.524 m) 141 lb (64 kg) 27.54 kg/m2 Postmenopausal  
 Smoking Status Never Smoker Vitals History BMI and BSA Data Body Mass Index Body Surface Area 27.54 kg/m 2 1.65 m 2 Preferred Pharmacy Pharmacy Name Phone Annamarie Leal, [place] - [address] [facility] [address] [phone] Your Updated Medication List  
  
   
This list is accurate as of: 8/28/17  2:22 PM.  Always use your most recent med list. amLODIPine 10 mg tablet Commonly known as:  Avery Doni TAKE 1 TABLET EVERY DAY  
  
 aspirin delayed-release 81 mg tablet Take 1 Tab by mouth daily. biotin 2,500 mcg Tab Take 5,000 Units by mouth daily. CALCIUM + D PO Take  by mouth. cycloSPORINE 0.05 % ophthalmic emulsion Commonly known as:  RESTASIS Administer 1 Drop to both eyes two (2) times a day. esomeprazole 20 mg capsule Commonly known as:  NEXIUM  
TAKE 1 CAPSULE EVERY DAY AS NEEDED  
  
 fenofibrate nanocrystallized 145 mg tablet Commonly known as:  TRICOR  
TAKE 1 TABLET DAILY FOR HYPERLIPIDEMIA  
  
 fluticasone 50 mcg/actuation nasal spray Commonly known as:  FLONASE  
USE 2 SPRAYS IN EACH NOSTRIL EVERY DAY  
  
 ibandronate 150 mg tablet Commonly known as:  Virl Ranjan Take 150 mg by mouth every thirty (30) days. * levothyroxine 88 mcg tablet Commonly known as:  SYNTHROID Take 1 Tab by mouth Daily (before breakfast). * levothyroxine 88 mcg tablet Commonly known as:  SYNTHROID Take one tablet daily most days and 1.5 tablets daily on Sundays (7.5 tablets total/week = 94 mcg/day average dose)  
  
 linaclotide 145 mcg Cap capsule Commonly known as:  Anette Laquita Take 1 Cap by mouth Daily (before breakfast). lisinopril 20 mg tablet Commonly known as:  PRINIVIL, ZESTRIL  
TAKE 1 TABLET EVERY DAY  
  
 multivit-min-folic acid-biotin 46.2-0,403 mcg Tab Take  by mouth.  
  
 multivitamin tablet Commonly known as:  ONE A DAY Take 1 Tab by mouth daily. rosuvastatin 20 mg tablet Commonly known as:  CRESTOR  
TAKE 1 TABLET EVERY NIGHT  
  
 sertraline 100 mg tablet Commonly known as:  ZOLOFT  
TAKE 1 TABLET EVERY DAY  
  
 traZODone 50 mg tablet Commonly known as:  DESYREL  
TAKE 2 TABLETS EVERY NIGHT * Notice: This list has 2 medication(s) that are the same as other medications prescribed for you. Read the directions carefully, and ask your doctor or other care provider to review them with you. We Performed the Following CELIAC ANTIBODY PROFILE [[id number] Custom] METHYLMALONIC ACID [79306 CPT(R)] T4, FREE T6472421 CPT(R)] TSH 3RD GENERATION [36105 CPT(R)] Follow-up Instructions Return in about 6 months (around 2/28/2018). Patient Instructions Hypothyroidism following radioactive iodine: 
 
- labs on 88 mcg 
- adjust based on labs. Will also check antibodies. After any adjustments, we'll reassess labs in 3 months. Recommend walking/exercising regularly Go to bed at same time every night Diabetes. Watch carbohydrate intake with meals and aim to keep this less than 30-45 grams per meal. 
Limiting carbohydrate intake is helpful for post-menopausal women trying to lose weight. A Mediterranean style diet is a healthy way of eating. This diet consists of vegetables, whole fruit, nuts, whole grains, beans, lentils, olive oil, fish, chicken, and less refined grains, red and processed meats. Recommend having fish, chicken, turkey, eggs, cheese as sources of protein. Have salads or frozen vegetables with most meals. Introducing [facility]! [facility] introduces OpenSpace patient portal. Now you can access parts of your medical record, email your doctor's office, and request medication refills online. 1. In your internet browser, go to [email or web address] 2. Click on the First Time User? Click Here link in the Sign In box. You will see the New Member Sign Up page. 3. Enter your OpenSpace Access Code exactly as it appears below.  You will not need to use this code after youve completed the sign-up process. If you do not sign up before the expiration date, you must request a new code. · Timeet Access Code: [email or web address] Expires: 11/26/2017  2:21 PM 
 
4. Enter the last four digits of your Social Security Number (xxxx) and Date of Birth (mm/dd/yyyy) as indicated and click Submit. You will be taken to the next sign-up page. 5. Create a Timeet ID. This will be your Timeet login ID and cannot be changed, so think of one that is secure and easy to remember. 6. Create a Timeet password. You can change your password at any time. 7. Enter your Password Reset Question and Answer. This can be used at a later time if you forget your password. 8. Enter your e-mail address. You will receive e-mail notification when new information is available in [address]. 9. Click Sign Up. You can now view and download portions of your medical record. 10. Click the Download Summary menu link to download a portable copy of your medical information. If you have questions, please visit the Frequently Asked Questions section of the Timeet website. Remember, Timeet is NOT to be used for urgent needs. For medical emergencies, dial 911. Now available from your iPhone and Android! Please provide this summary of care documentation to your next provider. Your primary care clinician is listed as [id number] If you have any questions after today's visit, please call [phone].

## 2017-08-28 NOTE — PROGRESS NOTES
Patient complains of low energy and would like labs, such as vitamin B12, to evaluate. She complains of dry, burning eyes. She has a lot of family stress dealing with an ill sister, and another sister who recently broke her femur in [place]. Patient would like to discuss diet. Feels she stress eats.

## 2017-08-31 LAB
GLIADIN PEPTIDE IGA SER-ACNC: 3 UNITS (ref 0–19)
GLIADIN PEPTIDE IGG SER-ACNC: 2 UNITS (ref 0–19)
IGA SERPL-MCNC: 107 MG/DL (ref 87–352)
METHYLMALONATE SERPL-SCNC: 321 NMOL/L (ref 0–378)
T4 FREE SERPL-MCNC: 1.54 NG/DL (ref 0.82–1.77)
TSH SERPL DL<=0.005 MIU/L-ACNC: 1.4 UIU/ML (ref 0.45–4.5)
TTG IGA SER-ACNC: <2 U/ML (ref 0–3)
TTG IGG SER-ACNC: <2 U/ML (ref 0–5)

## 2017-09-04 NOTE — PROGRESS NOTES
Labs are normal and at goal.  Continue levothyroxine 88 mcg. Lab letter to be mailed  Jesus Veliz,  Please tell her that all of her labs returned normal and that her thyroid medication will remain the same strength. Thank you  I will mail the lab letter.

## 2017-09-06 DIAGNOSIS — M81.0 OSTEOPOROSIS, UNSPECIFIED OSTEOPOROSIS TYPE, UNSPECIFIED PATHOLOGICAL FRACTURE PRESENCE: Primary | ICD-10-CM

## 2017-09-06 RX ORDER — IBANDRONATE SODIUM 150 MG/1
TABLET, FILM COATED ORAL
Qty: 3 TAB | Refills: 3 | Status: SHIPPED | OUTPATIENT
Start: 2017-09-06 | End: 2018-07-26 | Stop reason: SDUPTHER

## 2017-09-07 ENCOUNTER — OFFICE VISIT (OUTPATIENT)
Dept: INTERNAL MEDICINE CLINIC | Age: 67
End: 2017-09-07

## 2017-09-07 VITALS
WEIGHT: 140 LBS | BODY MASS INDEX: 27.34 KG/M2 | TEMPERATURE: 98.5 F | RESPIRATION RATE: 16 BRPM | OXYGEN SATURATION: 98 % | HEART RATE: 68 BPM | DIASTOLIC BLOOD PRESSURE: 74 MMHG | SYSTOLIC BLOOD PRESSURE: 137 MMHG

## 2017-09-07 DIAGNOSIS — F41.9 ANXIETY AND DEPRESSION: ICD-10-CM

## 2017-09-07 DIAGNOSIS — E78.5 HYPERLIPIDEMIA, UNSPECIFIED HYPERLIPIDEMIA TYPE: ICD-10-CM

## 2017-09-07 DIAGNOSIS — F32.A ANXIETY AND DEPRESSION: ICD-10-CM

## 2017-09-07 DIAGNOSIS — Z23 ENCOUNTER FOR IMMUNIZATION: ICD-10-CM

## 2017-09-07 DIAGNOSIS — R73.9 HYPERGLYCEMIA: ICD-10-CM

## 2017-09-07 DIAGNOSIS — M75.42 ROTATOR CUFF IMPINGEMENT SYNDROME, LEFT: ICD-10-CM

## 2017-09-07 DIAGNOSIS — Z00.00 INITIAL MEDICARE ANNUAL WELLNESS VISIT: Primary | ICD-10-CM

## 2017-09-07 DIAGNOSIS — M25.512 ACUTE PAIN OF LEFT SHOULDER: ICD-10-CM

## 2017-09-07 DIAGNOSIS — M25.50 ARTHRALGIA, UNSPECIFIED JOINT: ICD-10-CM

## 2017-09-07 DIAGNOSIS — M70.62 TROCHANTERIC BURSITIS OF LEFT HIP: ICD-10-CM

## 2017-09-07 DIAGNOSIS — J30.0 IRRITANT RHINITIS: ICD-10-CM

## 2017-09-07 DIAGNOSIS — M81.0 OSTEOPOROSIS, UNSPECIFIED OSTEOPOROSIS TYPE, UNSPECIFIED PATHOLOGICAL FRACTURE PRESENCE: ICD-10-CM

## 2017-09-07 DIAGNOSIS — I10 ESSENTIAL HYPERTENSION: ICD-10-CM

## 2017-09-07 DIAGNOSIS — E03.4 HYPOTHYROIDISM DUE TO ACQUIRED ATROPHY OF THYROID: ICD-10-CM

## 2017-09-07 DIAGNOSIS — E55.9 VITAMIN D DEFICIENCY: ICD-10-CM

## 2017-09-07 RX ORDER — SERTRALINE HYDROCHLORIDE 100 MG/1
150 TABLET, FILM COATED ORAL DAILY
Qty: 135 TAB | Refills: 1 | Status: SHIPPED | OUTPATIENT
Start: 2017-09-07 | End: 2017-10-20 | Stop reason: SDUPTHER

## 2017-09-07 RX ORDER — AZELASTINE 1 MG/ML
1 SPRAY, METERED NASAL 2 TIMES DAILY
Qty: 3 BOTTLE | Refills: 3 | Status: SHIPPED | OUTPATIENT
Start: 2017-09-07 | End: 2018-08-23

## 2017-09-07 NOTE — PROGRESS NOTES
This is an Initial Medicare Annual Wellness Exam (AWV) (Performed 12 months after IPPE or effective date of Medicare Part B enrollment, Once in a lifetime)    I have reviewed the patient's medical history in detail and updated the computerized patient record. History     Past Medical History:   Diagnosis Date    Arthritis     Carpal tunnel syndrome, bilateral     Depression     H/O colonoscopy     no polyps, + diverticulosis and nonbleeding internal hemorroids    Hypercholesterolemia     Hypertension     Lyme disease 2007    Menopause 1999    Osteoporosis 2016    Thyroid disease     Trochanteric bursitis       Past Surgical History:   Procedure Laterality Date    HX CHOLECYSTECTOMY      HX GYN           Current Outpatient Prescriptions   Medication Sig Dispense Refill    ibandronate (BONIVA) 150 mg tablet TAKE 1 TABLET (150 MG) EVERY THIRTY (30) DAYS 3 Tab 3    rosuvastatin (CRESTOR) 20 mg tablet TAKE 1 TABLET EVERY NIGHT 90 Tab 4    esomeprazole (NEXIUM) 20 mg capsule TAKE 1 CAPSULE EVERY DAY AS NEEDED 90 Cap 4    fenofibrate nanocrystallized (TRICOR) 145 mg tablet TAKE 1 TABLET DAILY FOR HYPERLIPIDEMIA 90 Tab 1    traZODone (DESYREL) 50 mg tablet TAKE 2 TABLETS EVERY NIGHT 180 Tab 1    amLODIPine (NORVASC) 10 mg tablet TAKE 1 TABLET EVERY DAY 90 Tab 1    lisinopril (PRINIVIL, ZESTRIL) 20 mg tablet TAKE 1 TABLET EVERY DAY 90 Tab 1    levothyroxine (SYNTHROID) 88 mcg tablet Take one tablet daily most days and 1.5 tablets daily on Sundays (7.5 tablets total/week = 94 mcg/day average dose) 100 Tab 3    sertraline (ZOLOFT) 100 mg tablet TAKE 1 TABLET EVERY DAY 90 Tab 1    levothyroxine (SYNTHROID) 88 mcg tablet Take 1 Tab by mouth Daily (before breakfast). 30 Tab 1    linaclotide (LINZESS) 145 mcg cap capsule Take 1 Cap by mouth Daily (before breakfast).  (Patient taking differently: Take 145 mcg by mouth as needed.) 90 Cap 3    fluticasone (FLONASE) 50 mcg/actuation nasal spray USE 2 SPRAYS IN EACH NOSTRIL EVERY DAY 48 g 4    biotin 2,500 mcg tab Take 5,000 Units by mouth daily.  cycloSPORINE (RESTASIS) 0.05 % ophthalmic emulsion Administer 1 Drop to both eyes two (2) times a day.  multivitamin (ONE A DAY) tablet Take 1 Tab by mouth daily.  multivit-min-folic acid-biotin 71.7-8,354 mcg tab Take  by mouth.  CALCIUM CARBONATE/VITAMIN D3 (CALCIUM + D PO) Take  by mouth.  aspirin delayed-release 81 mg tablet Take 1 Tab by mouth daily. 90 Tab 3     Allergies   Allergen Reactions    Mold Sneezing     History reviewed. No pertinent family history. Social History   Substance Use Topics    Smoking status: Never Smoker    Smokeless tobacco: Never Used    Alcohol use Yes      Comment: occasionally     Patient Active Problem List   Diagnosis Code    Essential hypertension I10    HLD (hyperlipidemia) E78.5    Hypothyroidism due to acquired atrophy of thyroid E03.4    Anxiety and depression F41.9, F32.9    Chronic constipation K59.09    Other seasonal allergic rhinitis J30.2    Osteoporosis M81.0    Carpal tunnel syndrome, bilateral G56.03    Trochanteric bursitis M70.60       Depression Risk Factor Screening:     PHQ over the last two weeks 9/7/2017   Little interest or pleasure in doing things Several days   Feeling down, depressed or hopeless Several days   Total Score PHQ 2 2     On zoloft at 100mg     Alcohol Risk Factor Screening: You do not drink alcohol or very rarely. Occasional drink only    Functional Ability and Level of Safety:     Hearing Loss  Hearing is good. Activities of Daily Living  The home contains: no safety equipment  Patient does total self care    Fall Risk  Fall Risk Assessment, last 12 mths 9/7/2017   Able to walk? Yes   Fall in past 12 months?  No       Abuse Screen  Patient is not abused    Cognitive Screening   Evaluation of Cognitive Function:  Has your family/caregiver stated any concerns about your memory: no      Patient Care Team   Patient Care Team:  Ramírez Marc MD as PCP - General (Internal Medicine)  Luisa Espinosa MD (Gastroenterology)  Ronald Bernheim, MD (Rheumatology)  Elvia Lopez MD as Consulting Provider (Endocrinology)    Assessment/Plan   Education and counseling provided:  Are appropriate based on today's review and evaluation      ICD-10-CM ICD-9-CM    1. Initial Medicare annual wellness visit Z00.00 V70.0    2. Rotator cuff impingement syndrome, left M75.42 726.10 HI DRAIN/INJECT LARGE JOINT/BURSA      REFERRAL TO PHYSICAL THERAPY   3. Acute pain of left shoulder M25.512 719.41    4. Essential hypertension I10 401.9 CBC WITH AUTOMATED DIFF   5. Hyperlipidemia, unspecified hyperlipidemia type E78.5 272.4 CK      LIPID PANEL      METABOLIC PANEL, COMPREHENSIVE   6. Hypothyroidism due to acquired atrophy of thyroid E03.4 244.8      246.8    7. Osteoporosis, unspecified osteoporosis type, unspecified pathological fracture presence M81.0 733.00    8. Trochanteric bursitis of left hip M70.62 726.5 REFERRAL TO PHYSICAL THERAPY   9. Vitamin D deficiency E55.9 268.9 VITAMIN D, 25 HYDROXY   10. Arthralgia, unspecified joint M25.50 719.40 SED RATE (ESR)      C REACTIVE PROTEIN, QT   11. Hyperglycemia R73.9 790.29 HEMOGLOBIN A1C WITH EAG   12. Encounter for immunization Z23 V03.89 PNEUMOCOCCAL POLYSACCHARIDE VACCINE, 23-VALENT, ADULT OR IMMUNOSUPPRESSED PT DOSE,   13. Anxiety and depression F41.9 300.00 sertraline (ZOLOFT) 100 mg tablet    F32.9 311    14. Irritant rhinitis J30.0 472.0 azelastine (ASTELIN) 137 mcg (0.1 %) nasal spray     Follow-up Disposition:  Return in about 6 months (around 3/7/2018), or if symptoms worsen or fail to improve, for blood pressure, cholesterol.    results and schedule of future studies reviewed with patient  reviewed diet, exercise and weight   cardiovascular risk and specific lipid/LDL goals reviewed  reviewed medications and side effects in detail

## 2017-09-07 NOTE — PROGRESS NOTES
I called patient and reviewed Dr. Tylor Mcdaniel notes regarding her lab results and she voiced understanding. Letter mailed.

## 2017-09-07 NOTE — PROGRESS NOTES
HISTORY OF PRESENT ILLNESS  Keshawn Mckeon is a 77 y.o. female. HPI  Presents for f/u HTN, lipids, acute care    Pt has been caring for sister who required shifting and transfer help following a femur fracture  Left shoulder/upper arm pain  Cannot get arm over her head now    Pt has had variable arthralgias  Pt made appt with rheum at Dr. Cheryl Vasquez office    Pt reports hx Lyme disease requiring extended abx course  Had fever, chills     Mood not as good    Past medical, Social, and Family history reviewed  Medications reviewed and updated. ROS  Complete ROS reviewed and negative or stable except as noted in HPI. Physical Exam   Constitutional: She is oriented to person, place, and time. She appears well-nourished. No distress. HENT:   Head: Normocephalic and atraumatic. Mouth/Throat: Oropharynx is clear and moist.   Eyes: EOM are normal. Pupils are equal, round, and reactive to light. No scleral icterus. Neck: Normal range of motion. Neck supple. No JVD present. Cardiovascular: Normal rate, regular rhythm and normal heart sounds. Exam reveals no gallop and no friction rub. No murmur heard. Pulmonary/Chest: Effort normal and breath sounds normal. No respiratory distress. She has no wheezes. She has no rales. Abdominal: Soft. She exhibits no distension. There is no tenderness. Musculoskeletal: Normal range of motion. She exhibits no edema. Left shoulder impingement   Lymphadenopathy:     She has no cervical adenopathy. Neurological: She is alert and oriented to person, place, and time. She exhibits normal muscle tone. Skin: Skin is warm. No rash noted. Psychiatric: She has a normal mood and affect. Nursing note and vitals reviewed. ASSESSMENT and PLAN    ICD-10-CM ICD-9-CM    1. Rotator cuff impingement syndrome, left M75.42 726.10 MD DRAIN/INJECT LARGE JOINT/BURSA      REFERRAL TO PHYSICAL THERAPY   2. Acute pain of left shoulder M25.512 719.41    3.  Essential hypertension I10 401.9 CBC WITH AUTOMATED DIFF   4. Hyperlipidemia, unspecified hyperlipidemia type E78.5 272.4 CK      LIPID PANEL      METABOLIC PANEL, COMPREHENSIVE   5. Hypothyroidism due to acquired atrophy of thyroid E03.4 244.8      246.8    6. Osteoporosis, unspecified osteoporosis type, unspecified pathological fracture presence M81.0 733.00    7. Trochanteric bursitis of left hip M70.62 726.5 REFERRAL TO PHYSICAL THERAPY   8. Vitamin D deficiency E55.9 268.9 VITAMIN D, 25 HYDROXY   9. Arthralgia, unspecified joint M25.50 719.40 SED RATE (ESR)      C REACTIVE PROTEIN, QT   10. Hyperglycemia R73.9 790.29 HEMOGLOBIN A1C WITH EAG   11. Encounter for immunization Z23 V03.89 PNEUMOCOCCAL POLYSACCHARIDE VACCINE, 23-VALENT, ADULT OR IMMUNOSUPPRESSED PT DOSE,   12. Initial Medicare annual wellness visit Z00.00 V70.0    13. Anxiety and depression F41.9 300.00 sertraline (ZOLOFT) 100 mg tablet    F32.9 311    14. Irritant rhinitis J30.0 472.0 azelastine (ASTELIN) 137 mcg (0.1 %) nasal spray     Follow-up Disposition:  Return in about 6 months (around 3/7/2018), or if symptoms worsen or fail to improve, for blood pressure, cholesterol.   results and schedule of future studies reviewed with patient  reviewed diet, exercise and weight   cardiovascular risk and specific lipid/LDL goals reviewed  reviewed medications and side effects in detail   Agree with Rheum eval for arthralgias  Defer trochanteric injection to rheum  Shoulder injection - left  Ref to PT  Pt to consider ACAC PREP program  Increase zoloft to 150mg daily      [facility]  OFFICE PROCEDURE PROGRESS NOTE        Chart reviewed for the following:   Nidia Ayoub MD, have reviewed the History, Physical and updated the Allergic reactions for [place] performed immediately prior to start of procedure:   Nidia Ayoub MD, have performed the following reviews on Brandy Diggser prior to the start of the procedure: * Patient was identified by name and date of birth   * Agreement on procedure being performed was verified  * Risks and Benefits explained to the patient  * Procedure site verified and marked as necessary  * Patient was positioned for comfort  * Consent was signed and verified     Time: 11:45 am      Date of procedure: 9/7/2017    Procedure performed by:  Yuliana Santoro MD    Provider assisted by: Helena Osei 1 LPN    Patient assisted by: self    How tolerated by patient: tolerated the procedure well with no complications    Post Procedural Pain Scale: 2 - Hurts Little Bit    Comments: none          Indications:   Rotator cuff tendonitis    Procedure:  After consent was obtained, using sterile technique the left shoulder joint, posterior lateral subacromial approach was prepped using Betadine. Local anesthetic used: topical spray. Kenalog 20 mg was mixed with 1% lidocaine 2 ml  and injected into the joint and the needle withdrawn. The procedure was well tolerated. The patient is asked to continue to rest the joint for a few more days before resuming regular activities. It may be more painful for the first 1-2 days. Watch for fever, or increased swelling or persistent pain in the joint. Call or return to clinic prn if such symptoms occur or there is failure to improve as anticipated.

## 2017-09-07 NOTE — PATIENT INSTRUCTIONS

## 2017-09-07 NOTE — MR AVS SNAPSHOT
Visit Information Date & Time Provider Department Dept. Phone Encounter #  
 9/7/2017 10:00 AM Darline Webb, [address] [place] [address] and Internal Medicine [phone] [id number] Follow-up Instructions Return in about 6 months (around 3/7/2018), or if symptoms worsen or fail to improve, for blood pressure, cholesterol. Your Appointments 11/13/2017  2:10 PM  
ROUTINE CARE with MD Zeeshan Jones [facility]) Appt Note: f/u diabetes cp0.00  
 [address]  
[address] [facility] [address] [address] Upcoming Health Maintenance Date Due Pneumococcal 65+ Low/Medium Risk (2 of 2 - PPSV23) 12/18/2016 MEDICARE YEARLY EXAM 1/7/2017 INFLUENZA AGE 9 TO ADULT 8/1/2017 BREAST CANCER SCRN MAMMOGRAM 4/18/2019 GLAUCOMA SCREENING Q2Y 7/1/2019 COLONOSCOPY 5/5/2021 DTaP/Tdap/Td series (2 - Td) 12/18/2025 Allergies as of 9/7/2017  Review Complete On: 9/7/2017 By: Darline Webb MD  
  
 Severity Noted Reaction Type Reactions Mold  12/17/2015    Sneezing Current Immunizations  Reviewed on 9/7/2017 Name Date Influenza Vaccine 1/11/2017 Pneumococcal Conjugate (PCV-13) 12/18/2015 Pneumococcal Polysaccharide (PPSV-23)  Incomplete Tdap 12/18/2015 Zoster Vaccine, Live 2/16/2017 Reviewed by Darline Webb MD on 9/7/2017 at 11:01 AM  
You Were Diagnosed With   
  
 Codes Comments Rotator cuff impingement syndrome, left    -  Primary ICD-10-CM: M75.42 
ICD-9-CM: 726.10 Acute pain of left shoulder     ICD-10-CM: M25.512 ICD-9-CM: 719.41 Essential hypertension     ICD-10-CM: I10 
ICD-9-CM: 401.9 Hyperlipidemia, unspecified hyperlipidemia type     ICD-10-CM: E78.5 ICD-9-CM: 272.4 Hypothyroidism due to acquired atrophy of thyroid     ICD-10-CM: E03.4 ICD-9-CM: 244.8, 246.8 Osteoporosis, unspecified osteoporosis type, unspecified pathological fracture presence     ICD-10-CM: M81.0 ICD-9-CM: 733.00 Trochanteric bursitis of left hip     ICD-10-CM: M70.62 ICD-9-CM: 726.5 Vitamin D deficiency     ICD-10-CM: E55.9 ICD-9-CM: 268.9 Arthralgia, unspecified joint     ICD-10-CM: M25.50 ICD-9-CM: 719.40 Hyperglycemia     ICD-10-CM: R73.9 ICD-9-CM: 790.29 Encounter for immunization     ICD-10-CM: L07 ICD-9-CM: V03.89 Initial Medicare annual wellness visit     ICD-10-CM: Z00.00 ICD-9-CM: V70.0 Anxiety and depression     ICD-10-CM: F41.9, F32.9 ICD-9-CM: 300.00, 311 Irritant rhinitis     ICD-10-CM: J30.0 ICD-9-CM: 472.0 Vitals BP Pulse Temp Resp Weight(growth percentile) SpO2  
 137/74 (BP 1 Location: Right arm, BP Patient Position: Sitting) 68 98.5 °F (36.9 °C) (Oral) 16 140 lb (63.5 kg) 98% BMI OB Status Smoking Status 27.34 kg/m2 Postmenopausal Never Smoker Vitals History BMI and BSA Data Body Mass Index Body Surface Area  
 27.34 kg/m 2 1.64 m 2 Preferred Pharmacy Pharmacy Name Phone [facility] [address] [place] [address] [phone] Your Updated Medication List  
  
   
This list is accurate as of: 9/7/17 11:48 AM.  Always use your most recent med list. amLODIPine 10 mg tablet Commonly known as:  Denis Andrea TAKE 1 TABLET EVERY DAY  
  
 aspirin delayed-release 81 mg tablet Take 1 Tab by mouth daily. azelastine 137 mcg (0.1 %) nasal spray Commonly known as:  ASTELIN  
1 Spray by Both Nostrils route two (2) times a day. Use in each nostril as directed  
  
 biotin 2,500 mcg Tab Take 5,000 Units by mouth daily. CALCIUM + D PO Take  by mouth. cycloSPORINE 0.05 % ophthalmic emulsion Commonly known as:  RESTASIS Administer 1 Drop to both eyes two (2) times a day. esomeprazole 20 mg capsule Commonly known as:  NEXIUM  
TAKE 1 CAPSULE EVERY DAY AS NEEDED  
  
 fenofibrate nanocrystallized 145 mg tablet Commonly known as:  TRICOR  
TAKE 1 TABLET DAILY FOR HYPERLIPIDEMIA  
  
 fluticasone 50 mcg/actuation nasal spray Commonly known as:  FLONASE  
USE 2 SPRAYS IN EACH NOSTRIL EVERY DAY  
  
 ibandronate 150 mg tablet Commonly known as:  Ebbie Marine TAKE 1 TABLET (150 MG) EVERY THIRTY (30) DAYS  
  
 * levothyroxine 88 mcg tablet Commonly known as:  SYNTHROID Take 1 Tab by mouth Daily (before breakfast). * levothyroxine 88 mcg tablet Commonly known as:  SYNTHROID Take one tablet daily most days and 1.5 tablets daily on Sundays (7.5 tablets total/week = 94 mcg/day average dose)  
  
 linaclotide 145 mcg Cap capsule Commonly known as:  Cheril Sorrow Take 1 Cap by mouth Daily (before breakfast). lisinopril 20 mg tablet Commonly known as:  PRINIVIL, ZESTRIL  
TAKE 1 TABLET EVERY DAY  
  
 multivit-min-folic acid-biotin 46.1-8,366 mcg Tab Take  by mouth.  
  
 multivitamin tablet Commonly known as:  ONE A DAY Take 1 Tab by mouth daily. rosuvastatin 20 mg tablet Commonly known as:  CRESTOR  
TAKE 1 TABLET EVERY NIGHT  
  
 sertraline 100 mg tablet Commonly known as:  ZOLOFT Take 1.5 Tabs by mouth daily. traZODone 50 mg tablet Commonly known as:  DESYREL  
TAKE 2 TABLETS EVERY NIGHT * Notice: This list has 2 medication(s) that are the same as other medications prescribed for you. Read the directions carefully, and ask your doctor or other care provider to review them with you. Prescriptions Sent to Pharmacy Refills  
 sertraline (ZOLOFT) 100 mg tablet 1 Sig: Take 1.5 Tabs by mouth daily. Class: Normal  
 Pharmacy: [address] [place] [address], [address] [place] [address] #: [phone]  Route: Oral  
 azelastine (ASTELIN) 137 mcg (0.1 %) nasal spray 3  
 Si [place] by Both Nostrils route two (2) times a day. Use in each nostril as directed Class: Normal  
 Pharmacy: [facility], [address]  #: [phone] Route: Both Nostrils We Performed the Following PNEUMOCOCCAL POLYSACCHARIDE VACCINE, 23-VALENT, ADULT OR IMMUNOSUPPRESSED PT DOSE, [51587 CPT(R)] FL DRAIN/INJECT LARGE JOINT/BURSA [id number] CPT(R)] REFERRAL TO PHYSICAL THERAPY [UVK26 Custom] Comments:  
 Please evaluate patient for left trochanteric bursitis and left rotator tendonitis. Follow-up Instructions Return in about 6 months (around 3/7/2018), or if symptoms worsen or fail to improve, for blood pressure, cholesterol. To-Do List   
 Around 2017 Lab:  C REACTIVE PROTEIN, QT Around 2017 Lab:  CBC WITH AUTOMATED DIFF Around 2017 Lab:  CK Around 2017 Lab:  HEMOGLOBIN A1C WITH EAG Around 2017 Lab:  LIPID PANEL Around 2017 Lab:  METABOLIC PANEL, COMPREHENSIVE Around 2017 Lab:  SED RATE (ESR) Around 2017 Lab:  VITAMIN D, 25 HYDROXY Referral Information Referral ID Referred By Referred To  
  
 [id number] Monisha Louis Not Available Visits Status Start Date End Date 1 New Request 17 If your referral has a status of pending review or denied, additional information will be sent to support the outcome of this decision. Patient Instructions Medicare Wellness Visit, Female The best way to live healthy is to have a healthy lifestyle by eating a well-balanced diet, exercising regularly, limiting alcohol and stopping smoking. Regular physical exams and screening tests are another way to keep healthy. Preventive exams provided by your health care provider can find health problems before they become diseases or illnesses.  Preventive services including immunizations, screening tests, monitoring and exams can help you take care of your own health. All people over age 72 should have a pneumovax  and and a prevnar shot to prevent pneumonia. These are once in a lifetime unless you and your provider decide differently. All people over 65 should have a yearly flu shot and a tetanus vaccine every 10 years. A bone mass density to screen for osteoporosis or thinning of the bones should be done every 2 years after 65. Screening for diabetes mellitus with a blood sugar test should be done every year. Glaucoma is a disease of the eye due to increased ocular pressure that can lead to blindness and it should be done every year by an eye professional. 
 
Cardiovascular screening tests that check for elevated lipids (fatty part of blood) which can lead to heart disease and strokes should be done every 5 years. Colorectal screening that evaluates for blood or polyps in your colon should be done yearly as a stool test or every five years as a flexible sigmoidoscope or every 10 years as a colonoscopy up to age 76. Breast cancer screening with a mammogram is recommended biennially  for women age 54-69. Screening for cervical cancer with a pap smear and pelvic exam is recommended for women after age 72 years every 2 years up to age 79 or when the provider and patient decide to stop. If there is a history of cervical abnormalities or other increased risk for cancer then the test is recommended yearly. Hepatitis C screening is also recommended for anyone born between [birth year] through [address]. A shingles vaccine is also recommended once in a lifetime after age 61. Your Medicare Wellness Exam is recommended annually. Here is a list of your current Health Maintenance items with a due date: 
Health Maintenance Due Topic Date Due  Pneumococcal Vaccine (2 of 2 - PPSV23) 12/18/2016 Haydee Carrillo Annual Well Visit  01/07/2017  Flu Vaccine  08/01/2017 Introducing [facility]! Yodit Mcdermott introduces [employer] patient portal. Now you can access parts of your medical record, email your doctor's office, and request medication refills online. 1. In your internet browser, go to [email or web address]. [email or web address]/[employer] 2. Click on the First Time User? Click Here link in the Sign In box. You will see the New Member Sign Up page. 3. Enter your [employer] Access Code exactly as it appears below. You will not need to use this code after youve completed the sign-up process. If you do not sign up before the expiration date, you must request a new code. · [employer] Access Code: [email or web address] Expires: 11/26/2017  2:21 PM 
 
4. Enter the last four digits of your Social Security Number (xxxx) and Date of Birth (mm/dd/yyyy) as indicated and click Submit. You will be taken to the next sign-up page. 5. Create a [employer] Writer ID. This will be your [employer] login ID and cannot be changed, so think of one that is secure and easy to remember. 6. Create a [employer] password. You can change your password at any time. 7. Enter your Password Reset Question and Answer. This can be used at a later time if you forget your password. 8. Enter your e-mail address. You will receive e-mail notification when new information is available in [address] 9. Click Sign Up. You can now view and download portions of your medical record. 10. Click the Download Summary menu link to download a portable copy of your medical information. If you have questions, please visit the Frequently Asked Questions section of the [employer] website. Remember, [employer] is NOT to be used for urgent needs. For medical emergencies, dial 911. Now available from your iPhone and Android! Please provide this summary of care documentation to your next provider. Your primary care clinician is listed as [address]  If you have any questions after today's visit, please call [phone].

## 2017-09-07 NOTE — PROGRESS NOTES
Rm    Chief Complaint   Patient presents with    Follow-up     bp, cholesterol    Arm Pain     left side, have used ice and heat pack    Excessive Sweating     and chills, pt states she has had lyme disease     1. Have you been to the ER, urgent care clinic since your last visit? Hospitalized since your last visit? No    2. Have you seen or consulted any other health care providers outside of the [address] [place] [address] since your last visit? Include any pap smears or colon screening. No     Health Maintenance Due   Topic Date Due    Pneumococcal 65+ Low/Medium Risk (2 of 2 - PPSV23) 12/18/2016    MEDICARE YEARLY EXAM  01/07/2017    INFLUENZA AGE 9 TO ADULT  08/01/2017     ADL Assessment 9/7/2017   Feeding yourself No Help Needed   Getting from bed to chair No Help Needed   Getting dressed No Help Needed   Bathing or showering No Help Needed   Walk across the room (includes cane/walker) No Help Needed   Using the telphone No Help Needed   Taking your medications No Help Needed   Preparing meals No Help Needed   Managing money (expenses/bills) No Help Needed   Moderately strenuous housework (laundry) No Help Needed   Shopping for personal items (toiletries/medicines) No Help Needed   Shopping for groceries No Help Needed   Driving No Help Needed   Climbing a flight of stairs No Help Needed   Getting to places beyond walking distances No Help Needed       Abuse Screening Questionnaire 9/7/2017   Do you ever feel afraid of your partner? N   Are you in a relationship with someone who physically or mentally threatens you? N   Is it safe for you to go home? Y     PHQ over the last two weeks 9/7/2017   Little interest or pleasure in doing things Several days   Feeling down, depressed or hopeless Several days   Total Score PHQ 2 2     Fall Risk Assessment, last 12 mths 9/7/2017   Able to walk? Yes   Fall in past 12 months?  No

## 2017-09-15 LAB
25(OH)D3+25(OH)D2 SERPL-MCNC: 52.7 NG/ML (ref 30–100)
ALBUMIN SERPL-MCNC: 4.4 G/DL (ref 3.6–4.8)
ALBUMIN/GLOB SERPL: 2 {RATIO} (ref 1.2–2.2)
ALP SERPL-CCNC: 32 IU/L (ref 39–117)
ALT SERPL-CCNC: 12 IU/L (ref 0–32)
AST SERPL-CCNC: 16 IU/L (ref 0–40)
BASOPHILS # BLD AUTO: 0.1 X10E3/UL (ref 0–0.2)
BASOPHILS NFR BLD AUTO: 1 %
BILIRUB SERPL-MCNC: 0.3 MG/DL (ref 0–1.2)
BUN SERPL-MCNC: 28 MG/DL (ref 8–27)
BUN/CREAT SERPL: 27 (ref 12–28)
CALCIUM SERPL-MCNC: 9.7 MG/DL (ref 8.7–10.3)
CHLORIDE SERPL-SCNC: 99 MMOL/L (ref 96–106)
CHOLEST SERPL-MCNC: 143 MG/DL (ref 100–199)
CK SERPL-CCNC: 47 U/L (ref 24–173)
CO2 SERPL-SCNC: 27 MMOL/L (ref 18–29)
CREAT SERPL-MCNC: 1.02 MG/DL (ref 0.57–1)
CRP SERPL-MCNC: 1.6 MG/L (ref 0–4.9)
EOSINOPHIL # BLD AUTO: 0.2 X10E3/UL (ref 0–0.4)
EOSINOPHIL NFR BLD AUTO: 4 %
ERYTHROCYTE [DISTWIDTH] IN BLOOD BY AUTOMATED COUNT: 14 % (ref 12.3–15.4)
ERYTHROCYTE [SEDIMENTATION RATE] IN BLOOD BY WESTERGREN METHOD: NORMAL MM/HR
EST. AVERAGE GLUCOSE BLD GHB EST-MCNC: 114 MG/DL
GLOBULIN SER CALC-MCNC: 2.2 G/DL (ref 1.5–4.5)
GLUCOSE SERPL-MCNC: 90 MG/DL (ref 65–99)
HBA1C MFR BLD: 5.6 % (ref 4.8–5.6)
HCT VFR BLD AUTO: 37.5 % (ref 34–46.6)
HDLC SERPL-MCNC: 70 MG/DL
HGB BLD-MCNC: 12.1 G/DL (ref 11.1–15.9)
IMM GRANULOCYTES # BLD: 0 X10E3/UL (ref 0–0.1)
IMM GRANULOCYTES NFR BLD: 0 %
LDLC SERPL CALC-MCNC: 56 MG/DL (ref 0–99)
LYMPHOCYTES # BLD AUTO: 1.5 X10E3/UL (ref 0.7–3.1)
LYMPHOCYTES NFR BLD AUTO: 29 %
MCH RBC QN AUTO: 28 PG (ref 26.6–33)
MCHC RBC AUTO-ENTMCNC: 32.3 G/DL (ref 31.5–35.7)
MCV RBC AUTO: 87 FL (ref 79–97)
MONOCYTES # BLD AUTO: 0.6 X10E3/UL (ref 0.1–0.9)
MONOCYTES NFR BLD AUTO: 13 %
NEUTROPHILS # BLD AUTO: 2.7 X10E3/UL (ref 1.4–7)
NEUTROPHILS NFR BLD AUTO: 53 %
PLATELET # BLD AUTO: 352 X10E3/UL (ref 150–379)
POTASSIUM SERPL-SCNC: 4.9 MMOL/L (ref 3.5–5.2)
PROT SERPL-MCNC: 6.6 G/DL (ref 6–8.5)
RBC # BLD AUTO: 4.32 X10E6/UL (ref 3.77–5.28)
SODIUM SERPL-SCNC: 141 MMOL/L (ref 134–144)
TRIGL SERPL-MCNC: 87 MG/DL (ref 0–149)
VLDLC SERPL CALC-MCNC: 17 MG/DL (ref 5–40)
WBC # BLD AUTO: 5.1 X10E3/UL (ref 3.4–10.8)

## 2017-10-25 DIAGNOSIS — J30.2 OTHER SEASONAL ALLERGIC RHINITIS: ICD-10-CM

## 2017-10-25 RX ORDER — LISINOPRIL 20 MG/1
TABLET ORAL
Qty: 90 TAB | Refills: 3 | Status: SHIPPED | OUTPATIENT
Start: 2017-10-25 | End: 2018-07-26 | Stop reason: SDUPTHER

## 2017-10-25 RX ORDER — FLUTICASONE PROPIONATE 50 MCG
SPRAY, SUSPENSION (ML) NASAL
Qty: 48 G | Refills: 4 | Status: SHIPPED | OUTPATIENT
Start: 2017-10-25 | End: 2018-02-22

## 2017-11-17 ENCOUNTER — TELEPHONE (OUTPATIENT)
Dept: INTERNAL MEDICINE CLINIC | Age: 67
End: 2017-11-17

## 2017-11-17 NOTE — TELEPHONE ENCOUNTER
Patient request for a z-pack be called in for a sinus infection. Per pt, she has been sick for two weeks. She states Dr. Araceli Taveras knows that she gets this around the same time every year. Patient will call back with new pharmacy information. Please call patient back at # [phone]. I offered to scheduled an appointment for Monday since we did not have anything available for today, pt declined.

## 2017-11-18 NOTE — TELEPHONE ENCOUNTER
Pt should be evaluated in the office or at urgent care to determine indications for antibiotics. Please encourage an office visit appt.

## 2017-11-20 NOTE — TELEPHONE ENCOUNTER
Spoke with patient after verifying name and  regarding Dr. Charles Akhtar recommendations. Writer informed patient of Dr. Charles Akhtar recommendations. Patient given an opportunity to ask questions, repeated information, and verbalized understanding.

## 2017-11-21 ENCOUNTER — OFFICE VISIT (OUTPATIENT)
Dept: INTERNAL MEDICINE CLINIC | Age: 67
End: 2017-11-21

## 2017-11-21 VITALS
OXYGEN SATURATION: 97 % | RESPIRATION RATE: 16 BRPM | WEIGHT: 139.4 LBS | SYSTOLIC BLOOD PRESSURE: 122 MMHG | TEMPERATURE: 99.3 F | HEART RATE: 84 BPM | DIASTOLIC BLOOD PRESSURE: 80 MMHG | HEIGHT: 60 IN | BODY MASS INDEX: 27.37 KG/M2

## 2017-11-21 DIAGNOSIS — J30.2 OTHER SEASONAL ALLERGIC RHINITIS: ICD-10-CM

## 2017-11-21 DIAGNOSIS — I10 ESSENTIAL HYPERTENSION: ICD-10-CM

## 2017-11-21 DIAGNOSIS — J30.0 CHRONIC VASOMOTOR RHINITIS: Primary | ICD-10-CM

## 2017-11-21 DIAGNOSIS — J32.9 SINUSITIS, UNSPECIFIED CHRONICITY, UNSPECIFIED LOCATION: ICD-10-CM

## 2017-11-21 DIAGNOSIS — E78.5 HYPERLIPIDEMIA, UNSPECIFIED HYPERLIPIDEMIA TYPE: ICD-10-CM

## 2017-11-21 RX ORDER — IPRATROPIUM BROMIDE 21 UG/1
2 SPRAY, METERED NASAL
Qty: 30 ML | Refills: 3 | Status: SHIPPED | OUTPATIENT
Start: 2017-11-21 | End: 2018-08-23

## 2017-11-21 RX ORDER — AZITHROMYCIN 250 MG/1
TABLET, FILM COATED ORAL
Qty: 6 TAB | Refills: 0 | Status: SHIPPED | OUTPATIENT
Start: 2017-11-21 | End: 2017-11-26

## 2017-11-21 NOTE — PROGRESS NOTES
HISTORY OF PRESENT ILLNESS  Claudean Lion is a 77 y.o. female. HPI  Presents for acute care    Cough and congestion x 3 weeks  Pt was visiting [place]  She feels like it occurs whenever she goes to [place]    Pt finds astelin helpful    Not using flonase    But, secretions have increased in thickness, colored  No clear fever or chills  +ear fullness and pain. Left shoulder better, not resolved. Has not had PT yet though. Past medical, Social, and Family history reviewed  Medications reviewed and updated. ROS  Complete ROS reviewed and negative or stable except as noted in HPI. Physical Exam   Constitutional: She is oriented to person, place, and time. She appears well-nourished. No distress. HENT:   Head: Normocephalic and atraumatic. Left Ear: Tympanic membrane is bulging. A middle ear effusion is present. Nose: Mucosal edema and sinus tenderness present. Mouth/Throat: Oropharynx is clear and moist.   Eyes: EOM are normal. Pupils are equal, round, and reactive to light. No scleral icterus. Neck: Normal range of motion. Neck supple. No JVD present. Cardiovascular: Normal rate, regular rhythm and normal heart sounds. Exam reveals no gallop and no friction rub. No murmur heard. Pulmonary/Chest: Effort normal and breath sounds normal. No respiratory distress. She has no wheezes. She has no rales. Abdominal: Soft. She exhibits no distension. There is no tenderness. Musculoskeletal: Normal range of motion. She exhibits no edema. Lymphadenopathy:     She has no cervical adenopathy. Neurological: She is alert and oriented to person, place, and time. She exhibits normal muscle tone. Skin: Skin is warm. No rash noted. Psychiatric: She has a normal mood and affect. Nursing note and vitals reviewed. Prior labs reviewed. ASSESSMENT and PLAN    ICD-10-CM ICD-9-CM    1. Chronic vasomotor rhinitis J30.0 477.9 ipratropium (ATROVENT) 0.03 % nasal spray   2.  Sinusitis, unspecified chronicity, unspecified location J32.9 473.9 azithromycin (ZITHROMAX) 250 mg tablet   3. Essential hypertension I10 401.9    4. Hyperlipidemia, unspecified hyperlipidemia type E78.5 272.4    5. Other seasonal allergic rhinitis J30.2 477.8      Follow-up Disposition:  Return in about 4 months (around 3/21/2018), or if symptoms worsen or fail to improve, for cholesterol, thyroid.    results and schedule of future studies reviewed with patient  reviewed diet, exercise and weight    cardiovascular risk and specific lipid/LDL goals reviewed  reviewed medications and side effects in detail   Trial of atrovent nasal spray as well  kari

## 2017-11-21 NOTE — PROGRESS NOTES
Rm 13    Chief Complaint   Patient presents with    Nasal Congestion     running down into throat ongoing for about 3 weeks    Cough     productive, yellow greenish     1. Have you been to the ER, urgent care clinic since your last visit? Hospitalized since your last visit? No    2. Have you seen or consulted any other health care providers outside of the [address] [place] [address] since your last visit? Include any pap smears or colon screening. No    There are no preventive care reminders to display for this patient. Fall Risk Assessment, last 12 mths 9/7/2017   Able to walk? Yes   Fall in past 12 months?  No     No new falls

## 2017-11-21 NOTE — MR AVS SNAPSHOT
Visit Information Date & Time Provider Department Dept. Phone Encounter #  
 11/21/2017  1:30 PM Monse García, [address] [place] [address] and Internal Medicine [phone] [id number] Follow-up Instructions Return in about 4 months (around 3/21/2018), or if symptoms worsen or fail to improve, for cholesterol, thyroid. Upcoming Health Maintenance Date Due  
 MEDICARE YEARLY EXAM 9/8/2018 BREAST CANCER SCRN MAMMOGRAM 4/18/2019 GLAUCOMA SCREENING Q2Y 7/1/2019 COLONOSCOPY 5/5/2021 DTaP/Tdap/Td series (2 - Td) 12/18/2025 Allergies as of 11/21/2017  Review Complete On: 11/21/2017 By: Monse García MD  
  
 Severity Noted Reaction Type Reactions Mold  12/17/2015    Sneezing Current Immunizations  Reviewed on 9/7/2017 Name Date Influenza Vaccine 1/11/2017 Pneumococcal Conjugate (PCV-13) 12/18/2015 Pneumococcal Polysaccharide (PPSV-23) 9/7/2017 Tdap 12/18/2015 Zoster Vaccine, Live 2/16/2017 Not reviewed this visit You Were Diagnosed With   
  
 Codes Comments Chronic vasomotor rhinitis    -  Primary ICD-10-CM: J30.0 ICD-9-CM: 477.9 Sinusitis, unspecified chronicity, unspecified location     ICD-10-CM: J32.9 ICD-9-CM: 473.9 Essential hypertension     ICD-10-CM: I10 
ICD-9-CM: 401.9 Hyperlipidemia, unspecified hyperlipidemia type     ICD-10-CM: E78.5 ICD-9-CM: 272.4 Other seasonal allergic rhinitis     ICD-10-CM: J30.2 ICD-9-CM: 477.8 Vitals BP Pulse Temp Resp Height(growth percentile) Weight(growth percentile) 122/80 (BP 1 Location: Left arm, BP Patient Position: Sitting) 84 99.3 °F (37.4 °C) (Oral) 16 5' (1.524 m) 139 lb 6.4 oz (63.2 kg) SpO2 BMI OB Status Smoking Status 97% 27.22 kg/m2 Postmenopausal Never Smoker Vitals History BMI and BSA Data Body Mass Index Body Surface Area  
 27.22 kg/m 2 1.64 m 2 Preferred Pharmacy Pharmacy Name Phone Ilan Perez [address] [phone] Your Updated Medication List  
  
   
This list is accurate as of: 11/21/17  2:06 PM.  Always use your most recent med list. amLODIPine 10 mg tablet Commonly known as:  Lou Colon TAKE 1 TABLET EVERY DAY  
  
 aspirin delayed-release 81 mg tablet Take 1 Tab by mouth daily. azelastine 137 mcg (0.1 %) nasal spray Commonly known as:  ASTELIN  
1 Spray by Both Nostrils route two (2) times a day. Use in each nostril as directed  
  
 azithromycin 250 mg tablet Commonly known as:  Charlotta Primrose Take 2 tablets today, then take 1 tablet daily  
  
 biotin 2,500 mcg Tab Take 5,000 Units by mouth daily. CALCIUM + D PO Take  by mouth. cycloSPORINE 0.05 % ophthalmic emulsion Commonly known as:  RESTASIS Administer 1 Drop to both eyes two (2) times a day. esomeprazole 20 mg capsule Commonly known as:  NEXIUM  
TAKE 1 CAPSULE EVERY DAY AS NEEDED  
  
 fenofibrate nanocrystallized 145 mg tablet Commonly known as:  TRICOR  
TAKE 1 TABLET DAILY FOR HYPERLIPIDEMIA  
  
 fluticasone 50 mcg/actuation nasal spray Commonly known as:  FLONASE  
USE 2 SPRAYS IN EACH NOSTRIL EVERY DAY  
  
 ibandronate 150 mg tablet Commonly known as:  Augustina Cassette TAKE 1 TABLET (150 MG) EVERY THIRTY (30) DAYS  
  
 ipratropium 0.03 % nasal spray Commonly known as:  ATROVENT  
2 Sprays by Both Nostrils route three (3) times daily as needed for Rhinitis. * levothyroxine 88 mcg tablet Commonly known as:  SYNTHROID Take 1 Tab by mouth Daily (before breakfast). * levothyroxine 88 mcg tablet Commonly known as:  SYNTHROID Take one tablet daily most days and 1.5 tablets daily on Sundays (7.5 tablets total/week = 94 mcg/day average dose)  
  
 linaclotide 145 mcg Cap capsule Commonly known as:  Hamzah Eglin Take 1 Cap by mouth Daily (before breakfast). lisinopril 20 mg tablet Commonly known as:  PRINIVIL, ZESTRIL  
TAKE 1 TABLET EVERY DAY  
  
 multivit-min-folic acid-biotin 89.2-5,905 mcg Tab Take  by mouth.  
  
 multivitamin tablet Commonly known as:  ONE A DAY Take 1 Tab by mouth daily. rosuvastatin 20 mg tablet Commonly known as:  CRESTOR  
TAKE 1 TABLET EVERY NIGHT  
  
 sertraline 100 mg tablet Commonly known as:  ZOLOFT Take 1.5 Tabs by mouth daily. traZODone 50 mg tablet Commonly known as:  DESYREL  
TAKE 2 TABLETS EVERY NIGHT * Notice: This list has 2 medication(s) that are the same as other medications prescribed for you. Read the directions carefully, and ask your doctor or other care provider to review them with you. Prescriptions Sent to Pharmacy Refills  
 ipratropium (ATROVENT) 0.03 % nasal spray 3 Si Sprays by Both Nostrils route three (3) times daily as needed for Rhinitis. Class: Normal  
 Pharmacy: [facility] Ph #: [phone] Route: Both Nostrils  
 azithromycin (ZITHROMAX) 250 mg tablet 0 Sig: Take 2 tablets today, then take 1 tablet daily Class: Normal  
 Pharmacy: [facility] Ph #: [phone] Follow-up Instructions Return in about 4 months (around 3/21/2018), or if symptoms worsen or fail to improve, for cholesterol, thyroid. Introducing [address]! Dear Debo Patricia: Thank you for requesting a [employer] account. Our records indicate that you already have an active [employer]e account. You can access your account anytime at [email or web address] Did you know that you can access your hospital and ER discharge instructions at any time in [employer]e? You can also review all of your test results from your hospital stay or ER visit. Additional Information If you have questions, please visit the Frequently Asked Questions section of the [employer] website at [email or web address] Remember, [employer] is NOT to be used for urgent needs. For medical emergencies, dial 911. Now available from your iPhone and Android! Please provide this summary of care documentation to your next provider. Your primary care clinician is listed as [address] If you have any questions after today's visit, please call [phone].

## 2017-12-13 ENCOUNTER — OFFICE VISIT (OUTPATIENT)
Dept: INTERNAL MEDICINE CLINIC | Age: 67
End: 2017-12-13

## 2017-12-13 VITALS
BODY MASS INDEX: 27.41 KG/M2 | HEART RATE: 70 BPM | DIASTOLIC BLOOD PRESSURE: 73 MMHG | WEIGHT: 139.6 LBS | TEMPERATURE: 98.5 F | RESPIRATION RATE: 16 BRPM | OXYGEN SATURATION: 97 % | HEIGHT: 60 IN | SYSTOLIC BLOOD PRESSURE: 126 MMHG

## 2017-12-13 DIAGNOSIS — R05.9 COUGH: Primary | ICD-10-CM

## 2017-12-13 DIAGNOSIS — E78.5 HYPERLIPIDEMIA, UNSPECIFIED HYPERLIPIDEMIA TYPE: ICD-10-CM

## 2017-12-13 DIAGNOSIS — K59.00 CONSTIPATION, UNSPECIFIED CONSTIPATION TYPE: ICD-10-CM

## 2017-12-13 DIAGNOSIS — H69.83 EUSTACHIAN TUBE DYSFUNCTION, BILATERAL: ICD-10-CM

## 2017-12-13 DIAGNOSIS — I10 ESSENTIAL HYPERTENSION: ICD-10-CM

## 2017-12-13 DIAGNOSIS — J30.2 OTHER SEASONAL ALLERGIC RHINITIS: ICD-10-CM

## 2017-12-13 RX ORDER — PREDNISONE 10 MG/1
TABLET ORAL
Qty: 21 TAB | Refills: 0 | Status: SHIPPED | OUTPATIENT
Start: 2017-12-13 | End: 2018-01-09 | Stop reason: SDUPTHER

## 2017-12-13 NOTE — PROGRESS NOTES
Rm 13    Chief Complaint   Patient presents with    Cold Symptoms     cough and congestion    Dry Eye     bilateral    Ear Pain     right ear, on and off     Pt would like to try a steroid if possible to help with cold symptoms    1. Have you been to the ER, urgent care clinic since your last visit? Hospitalized since your last visit? No    2. Have you seen or consulted any other health care providers outside of the [address] [place] [address] since your last visit? Include any pap smears or colon screening. No    There are no preventive care reminders to display for this patient. Fall Risk Assessment, last 12 mths 9/7/2017   Able to walk? Yes   Fall in past 12 months?  No   no new falls

## 2017-12-13 NOTE — PROGRESS NOTES
HISTORY OF PRESENT ILLNESS  Anna Webb is a 79 y.o. female. HPI  Presents for f/u cough, congestion    Throat \"clogged\"  Worse at night and in the AM    Using flonase and atrovent - some added benefit     Cough syrups help some    Past medical, Social, and Family history reviewed  Medications reviewed and updated. ROS  Complete ROS reviewed and negative or stable except as noted in HPI. Physical Exam   Constitutional: She is oriented to person, place, and time. She appears well-nourished. No distress. HENT:   Head: Normocephalic and atraumatic. Right Ear: Tympanic membrane is bulging. Left Ear: Tympanic membrane is bulging. A middle ear effusion (serous) is present. Nose: Mucosal edema and rhinorrhea present. Eyes: EOM are normal. Pupils are equal, round, and reactive to light. No scleral icterus. Neck: Normal range of motion. Neck supple. Cardiovascular: Normal rate, regular rhythm and normal heart sounds. Pulmonary/Chest: Effort normal and breath sounds normal. No respiratory distress. She has no wheezes. She has no rales. Abdominal: Soft. She exhibits no distension. There is no tenderness. Musculoskeletal: Normal range of motion. She exhibits no edema. Neurological: She is alert and oriented to person, place, and time. She exhibits normal muscle tone. Skin: Skin is warm. No rash noted. Psychiatric: She has a normal mood and affect. Nursing note and vitals reviewed. Prior labs reviewed. ASSESSMENT and PLAN    ICD-10-CM ICD-9-CM    1. Cough R05 786.2 predniSONE (DELTASONE) 10 mg tablet   2. Eustachian tube dysfunction, bilateral H69.83 381.81 predniSONE (DELTASONE) 10 mg tablet   3. Essential hypertension I10 401.9    4. Hyperlipidemia, unspecified hyperlipidemia type E78.5 272.4    5.  Other seasonal allergic rhinitis J30.2 477.8      Follow-up Disposition:  Return in about 3 months (around 3/13/2018), or if symptoms worsen or fail to improve, for cholesterol, thyroid.   results and schedule of future studies reviewed with patient  reviewed diet, exercise and weight  cardiovascular risk and specific lipid/LDL goals reviewed  reviewed medications and side effects in detail   Agree to pred taper  Continue nasal sprays - flonase, astelin, atrovent

## 2017-12-13 NOTE — MR AVS SNAPSHOT
Visit Information Date & Time Provider Department Dept. Phone Encounter #  
 12/13/2017 11:30 AM Cielo Forte, [address] [place] [address] and Internal Medicine [phone] [id number] Follow-up Instructions Return in about 3 months (around 3/13/2018), or if symptoms worsen or fail to improve, for cholesterol, thyroid. Your Appointments 1/8/2018 11:30 AM  
ROUTINE CARE with Nancy Haq MD  
[facility] Appt Note: f/u thyroid cp0.00; r/s  
 [address] [place] [address]  
[address] [facility] [address] [address] Upcoming Health Maintenance Date Due  
 MEDICARE YEARLY EXAM 9/8/2018 GLAUCOMA SCREENING Q2Y 7/1/2019 COLONOSCOPY 5/5/2021 DTaP/Tdap/Td series (2 - Td) 12/18/2025 Allergies as of 12/13/2017  Review Complete On: 12/13/2017 By: Cielo Forte MD  
  
 Severity Noted Reaction Type Reactions Mold  12/17/2015    Sneezing Current Immunizations  Reviewed on 9/7/2017 Name Date Influenza Vaccine 1/11/2017 Pneumococcal Conjugate (PCV-13) 12/18/2015 Pneumococcal Polysaccharide (PPSV-23) 9/7/2017 Tdap 12/18/2015 Zoster Vaccine, Live 2/16/2017 Not reviewed this visit You Were Diagnosed With   
  
 Codes Comments Cough    -  Primary ICD-10-CM: P34 ICD-9-CM: 786.2 Eustachian tube dysfunction, bilateral     ICD-10-CM: E77.08 ICD-9-CM: 381.81 Essential hypertension     ICD-10-CM: I10 
ICD-9-CM: 401.9 Hyperlipidemia, unspecified hyperlipidemia type     ICD-10-CM: E78.5 ICD-9-CM: 272.4 Other seasonal allergic rhinitis     ICD-10-CM: J30.2 ICD-9-CM: 477.8 Vitals BP Pulse Temp Resp Height(growth percentile) Weight(growth percentile) 126/73 (BP 1 Location: Left arm, BP Patient Position: Sitting) 70 98.5 °F (36.9 °C) (Oral) 16 5' (1.524 m) 139 lb 9.6 oz (63.3 kg) SpO2 BMI OB Status Smoking Status 97% 27.26 kg/m2 Postmenopausal Never Smoker BMI and BSA Data Body Mass Index Body Surface Area  
 27.26 kg/m 2 1.64 m 2 Preferred Pharmacy Pharmacy Name Phone Waldemar Ram [address] [facility], [address] [facility] [phone] Your Updated Medication List  
  
   
This list is accurate as of: 12/13/17 12:27 PM.  Always use your most recent med list. amLODIPine 10 mg tablet Commonly known as:  Butt Mullet TAKE 1 TABLET EVERY DAY  
  
 aspirin delayed-release 81 mg tablet Take 1 Tab by mouth daily. azelastine 137 mcg (0.1 %) nasal spray Commonly known as:  ASTELIN  
1 Spray by Both Nostrils route two (2) times a day. Use in each nostril as directed  
  
 biotin 2,500 mcg Tab Take 5,000 Units by mouth daily. CALCIUM + D PO Take  by mouth. cycloSPORINE 0.05 % ophthalmic emulsion Commonly known as:  RESTASIS Administer 1 Drop to both eyes two (2) times a day. esomeprazole 20 mg capsule Commonly known as:  NEXIUM  
TAKE 1 CAPSULE EVERY DAY AS NEEDED  
  
 fenofibrate nanocrystallized 145 mg tablet Commonly known as:  TRICOR  
TAKE 1 TABLET DAILY FOR HYPERLIPIDEMIA  
  
 fluticasone 50 mcg/actuation nasal spray Commonly known as:  FLONASE  
USE 2 SPRAYS IN EACH NOSTRIL EVERY DAY  
  
 ibandronate 150 mg tablet Commonly known as:  Natrona Parlor TAKE 1 TABLET (150 MG) EVERY THIRTY (30) DAYS  
  
 ipratropium 0.03 % nasal spray Commonly known as:  ATROVENT  
2 Sprays by Both Nostrils route three (3) times daily as needed for Rhinitis. * levothyroxine 88 mcg tablet Commonly known as:  SYNTHROID Take 1 Tab by mouth Daily (before breakfast). * levothyroxine 88 mcg tablet Commonly known as:  SYNTHROID Take one tablet daily most days and 1.5 tablets daily on Sundays (7.5 tablets total/week = 94 mcg/day average dose)  
  
 linaclotide 145 mcg Cap capsule Commonly known as:  Vitor Fresh Take 1 Cap by mouth Daily (before breakfast). lisinopril 20 mg tablet Commonly known as:  PRINIVIL, ZESTRIL  
TAKE 1 TABLET EVERY DAY  
  
 multivit-min-folic acid-biotin 50.7-6,449 mcg Tab Take  by mouth.  
  
 multivitamin tablet Commonly known as:  ONE A DAY Take 1 Tab by mouth daily. predniSONE 10 mg tablet Commonly known as:  Estela Chill Taper daily. 60mg x1, 50mg x 1, 40mg x 1, 30mg x 1, 20mg x 1, 10mg x 1  
  
 rosuvastatin 20 mg tablet Commonly known as:  CRESTOR  
TAKE 1 TABLET EVERY NIGHT  
  
 sertraline 100 mg tablet Commonly known as:  ZOLOFT Take 1.5 Tabs by mouth daily. traZODone 50 mg tablet Commonly known as:  DESYREL  
TAKE 2 TABLETS EVERY NIGHT * Notice: This list has 2 medication(s) that are the same as other medications prescribed for you. Read the directions carefully, and ask your doctor or other care provider to review them with you. Prescriptions Sent to Pharmacy Refills  
 predniSONE (DELTASONE) 10 mg tablet 0 Sig: Taper daily. 60mg x1, 50mg x 1, 40mg x 1, 30mg x 1, 20mg x 1, 10mg x 1 Class: Normal  
 Pharmacy: [address] #: [phone] Follow-up Instructions Return in about 3 months (around 3/13/2018), or if symptoms worsen or fail to improve, for cholesterol, thyroid. Introducing \[facility]\"" & HEALTH SERVICES! Dear George Fraire: Thank you for requesting a Flextrip account. Our records indicate that you already have an active Flextrip account. You can access your account anytime at [email or web address] Did you know that you can access your hospital and ER discharge instructions at any time in [employer]? You can also review all of your test results from your hospital stay or ER visit. Additional Information If you have questions, please visit the Frequently Asked Questions section of the [employer] website at [email or web address] Remember, [employer] is NOT to be used for urgent needs. For medical emergencies, dial 911. Now available from your iPhone and Android! Please provide this summary of care documentation to your next provider. Your primary care clinician is listed as [address] If you have any questions after today's visit, please call [phone].

## 2017-12-14 RX ORDER — AMLODIPINE BESYLATE 10 MG/1
TABLET ORAL
Qty: 90 TAB | Refills: 1 | Status: SHIPPED | OUTPATIENT
Start: 2017-12-14 | End: 2018-05-02 | Stop reason: SDUPTHER

## 2017-12-14 RX ORDER — TRAZODONE HYDROCHLORIDE 50 MG/1
TABLET ORAL
Qty: 180 TAB | Refills: 3 | Status: SHIPPED | OUTPATIENT
Start: 2017-12-14 | End: 2018-10-12 | Stop reason: SDUPTHER

## 2017-12-15 RX ORDER — LINACLOTIDE 145 UG/1
CAPSULE, GELATIN COATED ORAL
Qty: 90 CAP | Refills: 3 | Status: SHIPPED | OUTPATIENT
Start: 2017-12-15 | End: 2018-12-17

## 2018-01-09 ENCOUNTER — OFFICE VISIT (OUTPATIENT)
Dept: INTERNAL MEDICINE CLINIC | Age: 68
End: 2018-01-09

## 2018-01-09 VITALS
TEMPERATURE: 99 F | DIASTOLIC BLOOD PRESSURE: 70 MMHG | OXYGEN SATURATION: 97 % | SYSTOLIC BLOOD PRESSURE: 122 MMHG | HEIGHT: 60 IN | WEIGHT: 139.8 LBS | HEART RATE: 74 BPM | BODY MASS INDEX: 27.45 KG/M2 | RESPIRATION RATE: 16 BRPM

## 2018-01-09 DIAGNOSIS — J30.0 CHRONIC VASOMOTOR RHINITIS: ICD-10-CM

## 2018-01-09 DIAGNOSIS — J32.9 RECURRENT SINUSITIS: Primary | ICD-10-CM

## 2018-01-09 DIAGNOSIS — R05.9 COUGH: ICD-10-CM

## 2018-01-09 DIAGNOSIS — E03.4 HYPOTHYROIDISM DUE TO ACQUIRED ATROPHY OF THYROID: ICD-10-CM

## 2018-01-09 DIAGNOSIS — E78.5 HYPERLIPIDEMIA, UNSPECIFIED HYPERLIPIDEMIA TYPE: ICD-10-CM

## 2018-01-09 DIAGNOSIS — J30.2 OTHER SEASONAL ALLERGIC RHINITIS: ICD-10-CM

## 2018-01-09 DIAGNOSIS — I10 ESSENTIAL HYPERTENSION: ICD-10-CM

## 2018-01-09 DIAGNOSIS — H69.83 EUSTACHIAN TUBE DYSFUNCTION, BILATERAL: ICD-10-CM

## 2018-01-09 RX ORDER — LEVOFLOXACIN 500 MG/1
500 TABLET, FILM COATED ORAL DAILY
Qty: 7 TAB | Refills: 0 | Status: SHIPPED | OUTPATIENT
Start: 2018-01-09 | End: 2018-01-16

## 2018-01-09 RX ORDER — PREDNISONE 10 MG/1
TABLET ORAL
Qty: 21 TAB | Refills: 0 | Status: SHIPPED | OUTPATIENT
Start: 2018-01-09 | End: 2018-02-22

## 2018-01-09 NOTE — MR AVS SNAPSHOT
Visit Information Date & Time Provider Department Dept. Phone Encounter #  
 1/9/2018  2:00 PM Donnell Ulloa, [address] [place] [address], [place] and Internal Medicine [phone] [id number] Your Appointments 1/30/2018 12:10 PM  
ROUTINE CARE with Gail Arias MD  
[facility] Gene Hatch) Appt Note: f/u thyroid cp40.00; r/s; r/s st 12/19/17  
 [address] [place] [address] [address] [address]  
[address] [place] [address] Dr  
  
    
 3/13/2018  2:00 PM  
PHYSICAL PRE OP with Donnell Ulloa MD  
[facility] and Internal Medicine Gene [facility]atch) Appt Note: 3 mo f/u for cholesterol, thyroid [id number] [facility] [address] [facility] [address]  
[address] [address] [facility] [address] Upcoming Health Maintenance Date Due  
 MEDICARE YEARLY EXAM 9/8/2018 BREAST CANCER SCRN MAMMOGRAM 4/18/2019 GLAUCOMA SCREENING Q2Y 7/1/2019 COLONOSCOPY 5/5/2021 DTaP/Tdap/Td series (2 - Td) 12/18/2025 Allergies as of 1/9/2018  Review Complete On: 1/9/2018 By: Donnell Ulloa MD  
  
 Severity Noted Reaction Type Reactions Mold  12/17/2015    Sneezing Current Immunizations  Reviewed on 12/13/2017 Name Date Influenza Vaccine 10/12/2017, 1/11/2017 Pneumococcal Conjugate (PCV-13) 12/18/2015 Pneumococcal Polysaccharide (PPSV-23) 9/7/2017 Tdap 12/18/2015 Zoster Vaccine, Live 2/16/2017 Not reviewed this visit You Were Diagnosed With   
  
 Codes Comments Recurrent sinusitis    -  Primary ICD-10-CM: J32.9 ICD-9-CM: 473.9 Other seasonal allergic rhinitis     ICD-10-CM: J30.2 ICD-9-CM: 477.8 Chronic vasomotor rhinitis     ICD-10-CM: J30.0 ICD-9-CM: 477.9 Hypothyroidism due to acquired atrophy of thyroid     ICD-10-CM: E03.4 ICD-9-CM: 244.8, 246.8 Hyperlipidemia, unspecified hyperlipidemia type     ICD-10-CM: E78.5 ICD-9-CM: 272.4 Essential hypertension     ICD-10-CM: I10 
ICD-9-CM: 401.9 Eustachian tube dysfunction, bilateral     ICD-10-CM: G44.91 ICD-9-CM: 381.81 Cough     ICD-10-CM: R05 ICD-9-CM: 472. 2 Vitals BP Pulse Temp Resp Height(growth percentile) Weight(growth percentile) 122/70 (BP 1 Location: Left arm, BP Patient Position: Sitting) 74 99 °F (37.2 °C) (Oral) 16 5' (1.524 m) 139 lb 12.8 oz (63.4 kg) SpO2 BMI OB Status Smoking Status 97% 27.3 kg/m2 Postmenopausal Never Smoker BMI and BSA Data Body Mass Index Body Surface Area  
 27.3 kg/m 2 1.64 m 2 [facility] Pharmacy [facility] [address] [facility], [place] [address] [phone] Your Updated Medication List  
  
   
This list is accurate as of: 1/9/18  3:08 PM.  Always use your most recent med list. amLODIPine 10 mg tablet Commonly known as:  [place] Pique TAKE 1 TABLET EVERY DAY  
  
 aspirin delayed-release 81 mg tablet Take 1 Tab by mouth daily. azelastine 137 mcg (0.1 %) nasal spray Commonly known as:  ASTELIN  
1 Spray by Both Nostrils route two (2) times a day. Use in each nostril as directed  
  
 biotin 2,500 mcg Tab Take 5,000 Units by mouth daily. CALCIUM + D PO Take  by mouth. cycloSPORINE 0.05 % ophthalmic emulsion Commonly known as:  RESTASIS Administer 1 Drop to both eyes two (2) times a day. esomeprazole 20 mg capsule Commonly known as:  NEXIUM  
TAKE 1 CAPSULE EVERY DAY AS NEEDED  
  
 fenofibrate nanocrystallized 145 mg tablet Commonly known as:  TRICOR  
TAKE 1 TABLET DAILY FOR HYPERLIPIDEMIA  
  
 fluticasone 50 mcg/actuation nasal spray Commonly known as:  FLONASE  
USE 2 SPRAYS IN EACH NOSTRIL EVERY DAY  
  
 ibandronate 150 mg tablet Commonly known as:  Maru Bret TAKE 1 TABLET (150 MG) EVERY THIRTY (30) DAYS ipratropium 0.03 % nasal spray Commonly known as:  ATROVENT  
2 Sprays by Both Nostrils route three (3) times daily as needed for Rhinitis. levoFLOXacin 500 mg tablet Commonly known as:  Naaman Delude Take 1 Tab by mouth daily for 7 days. levothyroxine 88 mcg tablet Commonly known as:  SYNTHROID  
TAKE 1 TABLET DAILY MOST DAYS AND 1 & 1/2 TABLETS DAILY ON SUNDAYS (7.5 TABLETS TOTAL/WEEK = 94 MCG/DAY AVERAGE DOSE) LINZESS 145 mcg Cap capsule Generic drug:  linaclotide TAKE 1 CAPSULE BY MOUTH DAILY (BEFORE BREAKFAST). lisinopril 20 mg tablet Commonly known as:  PRINIVIL, ZESTRIL  
TAKE 1 TABLET EVERY DAY  
  
 multivit-min-folic acid-biotin 77.1-1,181 mcg Tab Take  by mouth.  
  
 multivitamin tablet Commonly known as:  ONE A DAY Take 1 Tab by mouth daily. predniSONE 10 mg tablet Commonly known as:  Pilar Madden Taper daily. 60mg x1, 50mg x 1, 40mg x 1, 30mg x 1, 20mg x 1, 10mg x 1  
  
 rosuvastatin 20 mg tablet Commonly known as:  CRESTOR  
TAKE 1 TABLET EVERY NIGHT  
  
 sertraline 100 mg tablet Commonly known as:  ZOLOFT Take 1.5 Tabs by mouth daily. traZODone 50 mg tablet Commonly known as:  DESYREL  
TAKE 2 TABLETS EVERY NIGHT Prescriptions Sent to Pharmacy Refills  
 levoFLOXacin (LEVAQUIN) 500 mg tablet 0 Sig: Take 1 Tab by mouth daily for 7 days. Class: Normal  
 Pharmacy: [address], [address] [place] [address] Ph #: [phone] Route: Oral  
 predniSONE (DELTASONE) 10 mg tablet 0 Sig: Taper daily. 60mg x1, 50mg x 1, 40mg x 1, 30mg x 1, 20mg x 1, 10mg x 1 Class: Normal  
 Pharmacy: [address], [address] [place] [address] Ph #: [phone] We Performed the Following REFERRAL TO ENT-OTOLARYNGOLOGY [PHH21 Custom] Referral Information Referral ID Referred By Referred To  
  
 [id number]  [facility] [address] [facility] Fax: [phone] Visits Status Start Date End Date 1 New Request 1/9/18 1/9/19 If your referral has a status of pending review or denied, additional information will be sent to support the outcome of this decision. Introducing [facility] & HEALTH SERVICES! Dear Lucia Peter: Thank you for requesting a Zadspace account. Our records indicate that you already have an active Zadspace account. You can access your account anytime at [email or web address] Did you know that you can access your hospital and ER discharge instructions at any time in Zadspace? You can also review all of your test results from your hospital stay or ER visit. Additional Information If you have questions, please visit the Frequently Asked Questions section of the [employer] website at [email or web address] Remember, [employer] is NOT to be used for urgent needs. For medical emergencies, dial 911. Now available from your iPhone and Android! Please provide this summary of care documentation to your next provider. Your primary care clinician is listed as [address] If you have any questions after today's visit, please call [phone].

## 2018-01-09 NOTE — PROGRESS NOTES
Rm 14    Chief Complaint   Patient presents with    Cold Symptoms    Cough    Nasal Congestion     1. Have you been to the ER, urgent care clinic since your last visit? Hospitalized since your last visit? No    2. Have you seen or consulted any other health care providers outside of the [address] [place] [address] since your last visit? Include any pap smears or colon screening. No    There are no preventive care reminders to display for this patient. Fall Risk Assessment, last 12 mths 9/7/2017   Able to walk? Yes   Fall in past 12 months?  No   no new falls

## 2018-01-09 NOTE — PROGRESS NOTES
HISTORY OF PRESENT ILLNESS  Antonette Araya is a 79 y.o. female. HPI  Presents for acute care     Pt felt better after pred course  Though, never fully resolved sx    Then, visited sister down [address] for 2 weeks around [place]  Sister and nephew both sick with URI    Pt suspects she picked up their illness    Pt now has recurrence of increased cough and congestion x 7-10 days at this point  Cough and drainage at night most bothersome  +subjective fever, flushed feeling. Pt reports regular flonase use in the past couple of weeks, but reports +nosebleeds    Past medical, Social, and Family history reviewed  Medications reviewed and updated. ROS  Complete ROS reviewed and negative or stable except as noted in HPI. Physical Exam   Constitutional: She is oriented to person, place, and time. She appears well-nourished. No distress. HENT:   Head: Normocephalic and atraumatic. Left Ear: A middle ear effusion (serous) is present. Nose: Sinus tenderness present. Mouth/Throat: Oropharynx is clear and moist.   Eyes: EOM are normal. Pupils are equal, round, and reactive to light. No scleral icterus. Neck: Normal range of motion. Neck supple. No JVD present. Cardiovascular: Normal rate, regular rhythm and normal heart sounds. Exam reveals no gallop and no friction rub. No murmur heard. Pulmonary/Chest: Effort normal and breath sounds normal. No respiratory distress. She has no wheezes. She has no rales. Abdominal: Soft. She exhibits no distension. There is no tenderness. Musculoskeletal: Normal range of motion. She exhibits no edema. Lymphadenopathy:     She has no cervical adenopathy. Neurological: She is alert and oriented to person, place, and time. She exhibits normal muscle tone. Skin: Skin is warm. No rash noted. Psychiatric: She has a normal mood and affect. Nursing note and vitals reviewed. Prior labs reviewed. ASSESSMENT and PLAN    ICD-10-CM ICD-9-CM    1.  Recurrent sinusitis J32.9 473.9 REFERRAL TO ENT-OTOLARYNGOLOGY      levoFLOXacin (LEVAQUIN) 500 mg tablet   2. Other seasonal allergic rhinitis J30.2 477.8 REFERRAL TO ENT-OTOLARYNGOLOGY   3. Chronic vasomotor rhinitis J30.0 477.9 REFERRAL TO ENT-OTOLARYNGOLOGY   4. Hypothyroidism due to acquired atrophy of thyroid E03.4 244.8      246.8    5. Hyperlipidemia, unspecified hyperlipidemia type E78.5 272.4    6. Essential hypertension I10 401.9    7. Eustachian tube dysfunction, bilateral H69.83 381.81 REFERRAL TO ENT-OTOLARYNGOLOGY      predniSONE (DELTASONE) 10 mg tablet   8. Cough R05 786.2      Follow-up Disposition:  Return in about 2 months (around 3/13/2018), or if symptoms worsen or fail to improve, for as scheduled. .   results and schedule of future studies reviewed with patient  reviewed diet, exercise and weight   cardiovascular risk and specific lipid/LDL goals reviewed  reviewed medications and side effects in detail   levaquin   pred   Ref to ENT if recurs or persists

## 2018-02-22 ENCOUNTER — OFFICE VISIT (OUTPATIENT)
Dept: ENDOCRINOLOGY | Age: 68
End: 2018-02-22

## 2018-02-22 VITALS
WEIGHT: 139 LBS | SYSTOLIC BLOOD PRESSURE: 138 MMHG | DIASTOLIC BLOOD PRESSURE: 81 MMHG | HEART RATE: 70 BPM | BODY MASS INDEX: 27.29 KG/M2 | HEIGHT: 60 IN

## 2018-02-22 DIAGNOSIS — E89.0 HYPOTHYROIDISM FOLLOWING RADIOIODINE THERAPY: Primary | ICD-10-CM

## 2018-02-22 DIAGNOSIS — F41.9 ANXIETY AND DEPRESSION: ICD-10-CM

## 2018-02-22 DIAGNOSIS — F32.A ANXIETY AND DEPRESSION: ICD-10-CM

## 2018-02-22 NOTE — PROGRESS NOTES
History of Present Illness: Romelia Ordaz is a 79 y.o. female presents for follow-up of hypothyroidism  She had two treatments with METCALF in late  for Graves disease. TSH was low in 2016 and dose was decreased to 88 mcg. She has remained on this dose since. Had bad sinus infection in November/December and received several courses of abx. Seen allergist  Saw ENT. Chronic dry eyes remain a problem for her. Following with eye doctor. Sleep remains poor - taking trazodone for this. Feels tired during the daytime. This is stable, but she is feeling more tired. Not exercising regularly. Weight is stable. BMI is 32    Social:  Family - related stress. Sister broke her leg last and then sister's son (her nephew)  suddenly from a MVC last summer. Sister still dealing with this. Daughter and 4 grandchildren live in [place]. She feels depressed they are so far away. Has sister who is [de-identified] yo and caring for 3 grandchildren. She helps out her, but this relationship can be tense too. Past Medical History:   Diagnosis Date    Arthritis     Carpal tunnel syndrome, bilateral     Depression     H/O colonoscopy     no polyps, + diverticulosis and nonbleeding internal hemorroids    Hypercholesterolemia     Hypertension     Lyme disease     Menopause     Osteoporosis 2016    Thyroid disease     Trochanteric bursitis      Current Outpatient Prescriptions   Medication Sig    LINZESS 145 mcg cap capsule TAKE 1 CAPSULE BY MOUTH DAILY (BEFORE BREAKFAST).  amLODIPine (NORVASC) 10 mg tablet TAKE 1 TABLET EVERY DAY    traZODone (DESYREL) 50 mg tablet TAKE 2 TABLETS EVERY NIGHT    levothyroxine (SYNTHROID) 88 mcg tablet TAKE 1 TABLET DAILY MOST DAYS AND 1 & 1/2 TABLETS DAILY ON SUNDAYS (7.5 TABLETS TOTAL/WEEK = 94 MCG/DAY AVERAGE DOSE)    ipratropium (ATROVENT) 0.03 % nasal spray 2 Sprays by Both Nostrils route three (3) times daily as needed for Rhinitis.     lisinopril (PRINIVIL, ZESTRIL) 20 mg tablet TAKE 1 TABLET EVERY DAY    sertraline (ZOLOFT) 100 mg tablet Take 1.5 Tabs by mouth daily.  fenofibrate nanocrystallized (TRICOR) 145 mg tablet TAKE 1 TABLET DAILY FOR HYPERLIPIDEMIA    esomeprazole (NEXIUM) 20 mg capsule TAKE 1 CAPSULE EVERY DAY AS NEEDED    azelastine (ASTELIN) 137 mcg (0.1 %) nasal spray 1 [place] by Both Nostrils route two (2) times a day. Use in each nostril as directed    ibandronate (BONIVA) 150 mg tablet TAKE 1 TABLET (150 MG) EVERY THIRTY (30) DAYS    rosuvastatin (CRESTOR) 20 mg tablet TAKE 1 TABLET EVERY NIGHT    cycloSPORINE (RESTASIS) 0.05 % ophthalmic emulsion Administer 1 Drop to both eyes two (2) times a day.  multivitamin (ONE A DAY) tablet Take 1 Tab by mouth daily.  multivit-min-folic acid-biotin 07.7-8,409 mcg tab Take  by mouth.  CALCIUM CARBONATE/VITAMIN D3 (CALCIUM + D PO) Take  by mouth.  aspirin delayed-release 81 mg tablet Take 1 Tab by mouth daily.  predniSONE (DELTASONE) 10 mg tablet Taper daily. 60mg x1, 50mg x 1, 40mg x 1, 30mg x 1, 20mg x 1, 10mg x 1    fluticasone (FLONASE) 50 mcg/actuation nasal spray USE 2 SPRAYS IN EACH NOSTRIL EVERY DAY    biotin 2,500 mcg tab Take 5,000 Units by mouth daily. No current facility-administered medications for this visit.       Allergies   Allergen Reactions    Mold Sneezing       Review of Systems:  - Eyes: see HPI  - Cardiovascular: no chest pain  - Respiratory: no shortness of breath  - Musculoskeletal: + arthritis - to see rheumatologist tomorrow     Physical Examination:  Visit Vitals    /81    Pulse 70    Ht 5' (1.524 m)    Wt 139 lb (63 kg)    BMI 27.15 kg/m2   -   - General: pleasant, no distress, normal gait   HEENT: hearing intact, EOMI, + injection of sclera  - Neck: no thyromegaly or LAD  - Cardiovascular: regular, normal rate   - Respiratory: normal effort  - Integumentary: no edema  - Psychiatric: normal mood and affect    Data Reviewed: Component      Latest Ref Rng & Units 8/28/2017 8/28/2017 2/3/2017 2/3/2017           2:40 PM  2:40 PM  1:05 AM  1:05 AM   TSH      0.450 - 4.500 uIU/mL  1.400     T4, Free      0.82 - 1.77 ng/dL 1.54   1.51   Thyrotropin Receptor Ab, serum      0.00 - 1.75 IU/L       Thyroid peroxidase Ab      0 - 34 IU/mL   <6      Component      Latest Ref Rng & Units 2/3/2017 2/3/2017 11/18/2016           1:05 AM  1:05 AM 11:18 AM   TSH      0.450 - 4.500 uIU/mL  3.060 4.000   T4, Free      0.82 - 1.77 ng/dL      Thyrotropin Receptor Ab, serum      0.00 - 1.75 IU/L 1.04     Thyroid peroxidase Ab      0 - 34 IU/mL        Assessment/Plan:   1. Hypothyroidism following radioiodine therapy -   - feeling more tired. Will check thyroid labs to see if thyroid levels may be related. I suspect levels will be stable  - continue 88 mcg. Adjust dose as needed. 2. Anxiety and depression   - she will see counselor tomorrow. Provided a listening ear. Encouraged positive thinking and encouraged her to start exercising. Greater than 50% of 25 minute visit was spent counseling the patient about above. Patient Instructions   Hypothyroidism following radioactive iodine:    - labs on 88 mcg    Recommend walking/exercising regularly  Go to bed at same time every night      Follow-up Disposition:  Return in about 6 months (around 8/22/2018).     Copy sent to:

## 2018-02-22 NOTE — MR AVS SNAPSHOT
[address] 350 [facility] 
[phone] Patient: Elisabet Thornton MRN: [id number] [facility]:[id number] Visit Information Date & Time Provider Department Dept. Phone Encounter #  
 2/22/2018 11:30 AM Alex Isabel, 1024 [facility] Diabetes and Endocrinology [phone] [id number] Follow-up Instructions Return in about 6 months (around 8/22/2018). Your Appointments 3/13/2018  2:00 PM  
PHYSICAL PRE OP with Apple Rendon MD  
[facility] Pediatrics and Internal Medicine 365[address] [facility]) Appt Note: 3 mo f/u for cholesterol, thyroid 401 [facility] [place] [id number] [address] [place] [id number] Upcoming Health Maintenance Date Due  
 MEDICARE YEARLY EXAM 9/8/2018 BREAST CANCER SCRN MAMMOGRAM 4/18/2019 GLAUCOMA SCREENING Q2Y 7/1/2019 COLONOSCOPY 5/5/2021 DTaP/Tdap/Td series (2 - Td) 12/18/2025 Allergies as of 2/22/2018  Review Complete On: 2/22/2018 By: Alex Isabel MD  
  
 Severity Noted Reaction Type Reactions Mold  12/17/2015    Sneezing Current Immunizations  Reviewed on 12/13/2017 Name Date Influenza Vaccine 10/12/2017, 1/11/2017 Pneumococcal Conjugate (PCV-13) 12/18/2015 Pneumococcal Polysaccharide (PPSV-23) 9/7/2017 Tdap 12/18/2015 Zoster Vaccine, Live 2/16/2017 Not reviewed this visit You Were Diagnosed With   
  
 Codes Comments Hypothyroidism following radioiodine therapy    -  Primary ICD-10-CM: E89.0 ICD-9-CM: 244.1 Vitals BP Pulse Height(growth percentile) Weight(growth percentile) BMI OB Status 138/81 70 5' (1.524 m) 139 lb (63 kg) 27.15 kg/m2 Postmenopausal  
 Smoking Status Never Smoker Vitals History BMI and BSA Data Body Mass Index Body Surface Area  
 27.15 kg/m 2 1.63 m 2 Preferred Pharmacy Pharmacy Name Phone Annamarie Leal, [place] - [address] [facility] [address] [phone] Your Updated Medication List  
  
   
This list is accurate as of 2/22/18 12:11 PM.  Always use your most recent med list. amLODIPine 10 mg tablet Commonly known as:  Zion Sandhoff TAKE 1 TABLET EVERY DAY  
  
 aspirin delayed-release 81 mg tablet Take 1 Tab by mouth daily. azelastine 137 mcg (0.1 %) nasal spray Commonly known as:  ASTELIN  
1 Spray by Both Nostrils route two (2) times a day. Use in each nostril as directed CALCIUM + D PO Take  by mouth. cycloSPORINE 0.05 % ophthalmic emulsion Commonly known as:  RESTASIS Administer 1 Drop to both eyes two (2) times a day. esomeprazole 20 mg capsule Commonly known as:  NEXIUM  
TAKE 1 CAPSULE EVERY DAY AS NEEDED  
  
 fenofibrate nanocrystallized 145 mg tablet Commonly known as:  TRICOR  
TAKE 1 TABLET DAILY FOR HYPERLIPIDEMIA  
  
 ibandronate 150 mg tablet Commonly known as:  Nithin Poet TAKE 1 TABLET (150 MG) EVERY THIRTY (30) DAYS  
  
 ipratropium 0.03 % nasal spray Commonly known as:  ATROVENT  
2 Sprays by Both Nostrils route three (3) times daily as needed for Rhinitis. levothyroxine 88 mcg tablet Commonly known as:  SYNTHROID  
TAKE 1 TABLET DAILY MOST DAYS AND 1 & 1/2 TABLETS DAILY ON SUNDAYS (7.5 TABLETS TOTAL/WEEK = 94 MCG/DAY AVERAGE DOSE) LINZESS 145 mcg Cap capsule Generic drug:  linaclotide TAKE 1 CAPSULE BY MOUTH DAILY (BEFORE BREAKFAST). lisinopril 20 mg tablet Commonly known as:  PRINIVIL, ZESTRIL  
TAKE 1 TABLET EVERY DAY  
  
 multivit-min-folic acid-biotin 64.4-2,418 mcg Tab Take  by mouth.  
  
 multivitamin tablet Commonly known as:  ONE A DAY Take 1 Tab by mouth daily. rosuvastatin 20 mg tablet Commonly known as:  CRESTOR  
TAKE 1 TABLET EVERY NIGHT  
  
 sertraline 100 mg tablet Commonly known as:  ZOLOFT  
 Take 1.5 Tabs by mouth daily. traZODone 50 mg tablet Commonly known as:  DESYREL  
TAKE 2 TABLETS EVERY NIGHT We Performed the Following T4, FREE [id number] CPT(R)] TSH 3RD GENERATION [14571 CPT(R)] Follow-up Instructions Return in about 6 months (around 8/22/2018). Patient Instructions Hypothyroidism following radioactive iodine: 
 
- labs on 88 mcg Recommend walking/exercising regularly Go to bed at same time every night Introducing [facility]! Dear Lucia Peter: Thank you for requesting a [employer] account. Our records indicate that you already have an active [employer] account. You can access your account anytime at [email or web address] Did you know that you can access your hospital and ER discharge instructions at any time in [employer]? You can also review all of your test results from your hospital stay or ER visit. Additional Information If you have questions, please visit the Frequently Asked Questions section of the [employer] website at [email or web address] Remember, [employer] is NOT to be used for urgent needs. For medical emergencies, dial 911. Now available from your iPhone and Android! Please provide this summary of care documentation to your next provider. Your primary care clinician is listed as [address] If you have any questions after today's visit, please call [phone].

## 2018-02-22 NOTE — PATIENT INSTRUCTIONS
Hypothyroidism following radioactive iodine:    - labs on 88 mcg    Recommend walking/exercising regularly  Go to bed at same time every night

## 2018-02-23 LAB
T4 FREE SERPL-MCNC: 1.41 NG/DL (ref 0.82–1.77)
TSH SERPL DL<=0.005 MIU/L-ACNC: 1.49 UIU/ML (ref 0.45–4.5)

## 2018-03-05 ENCOUNTER — TELEPHONE (OUTPATIENT)
Dept: INTERNAL MEDICINE CLINIC | Age: 68
End: 2018-03-05

## 2018-03-05 NOTE — TELEPHONE ENCOUNTER
It is fine for her to take the higher dosing. Please verify whether documentation of the change is awaiting scanning. If this is the case, the insurance may have required a change for cost reasons, which I may have approved. Please encourage pt to contact her insurance company to clarify their dosing preference, since that is the only reason it would have been changed outside of the context of an office visit discussion or mychart note due to lab values.

## 2018-03-05 NOTE — TELEPHONE ENCOUNTER
Patient has called office very irritated and speaking very rude asking why Dr. Queenie Galvan okay increased dosing. Advised pt I did not see in her chart where Dr. Queenie Galvan had increased dose. She add's he had to have since she received the medicine. Her mail order sent her increased dosing of Tricore(160mg) and Nexium(40mg) she did not receive the Astelin as it is not covered by her insurance. She would like to know if she can take the increased amount.

## 2018-03-06 NOTE — TELEPHONE ENCOUNTER
LVM letting pt know that she may take the increase dosage and encouragement to contact insurance company in reference to change in med.

## 2018-03-19 DIAGNOSIS — F32.A ANXIETY AND DEPRESSION: ICD-10-CM

## 2018-03-19 DIAGNOSIS — F41.9 ANXIETY AND DEPRESSION: ICD-10-CM

## 2018-03-19 RX ORDER — SERTRALINE HYDROCHLORIDE 100 MG/1
TABLET, FILM COATED ORAL
Qty: 135 TAB | Refills: 1 | Status: SHIPPED | OUTPATIENT
Start: 2018-03-19 | End: 2018-05-10 | Stop reason: SDUPTHER

## 2018-04-10 ENCOUNTER — OFFICE VISIT (OUTPATIENT)
Dept: INTERNAL MEDICINE CLINIC | Age: 68
End: 2018-04-10

## 2018-04-10 VITALS
RESPIRATION RATE: 16 BRPM | TEMPERATURE: 98.6 F | HEIGHT: 60 IN | SYSTOLIC BLOOD PRESSURE: 138 MMHG | WEIGHT: 138.2 LBS | HEART RATE: 70 BPM | DIASTOLIC BLOOD PRESSURE: 78 MMHG | BODY MASS INDEX: 27.13 KG/M2 | OXYGEN SATURATION: 98 %

## 2018-04-10 DIAGNOSIS — I10 ESSENTIAL HYPERTENSION: ICD-10-CM

## 2018-04-10 DIAGNOSIS — Z86.19 HISTORY OF LYME DISEASE: ICD-10-CM

## 2018-04-10 DIAGNOSIS — R73.9 HYPERGLYCEMIA: ICD-10-CM

## 2018-04-10 DIAGNOSIS — F41.9 ANXIETY AND DEPRESSION: ICD-10-CM

## 2018-04-10 DIAGNOSIS — E55.9 VITAMIN D DEFICIENCY: ICD-10-CM

## 2018-04-10 DIAGNOSIS — E78.5 HYPERLIPIDEMIA, UNSPECIFIED HYPERLIPIDEMIA TYPE: ICD-10-CM

## 2018-04-10 DIAGNOSIS — E53.8 B12 DEFICIENCY: ICD-10-CM

## 2018-04-10 DIAGNOSIS — F32.A ANXIETY AND DEPRESSION: ICD-10-CM

## 2018-04-10 DIAGNOSIS — E03.4 HYPOTHYROIDISM DUE TO ACQUIRED ATROPHY OF THYROID: ICD-10-CM

## 2018-04-10 DIAGNOSIS — R53.83 FATIGUE, UNSPECIFIED TYPE: Primary | ICD-10-CM

## 2018-04-10 RX ORDER — BUPROPION HYDROCHLORIDE 150 MG/1
150 TABLET ORAL
Qty: 30 TAB | Refills: 5 | Status: SHIPPED | OUTPATIENT
Start: 2018-04-10 | End: 2018-04-10 | Stop reason: SDUPTHER

## 2018-04-10 RX ORDER — BUPROPION HYDROCHLORIDE 150 MG/1
150 TABLET ORAL
Qty: 90 TAB | Refills: 1 | Status: SHIPPED | OUTPATIENT
Start: 2018-04-10 | End: 2018-04-30 | Stop reason: SINTOL

## 2018-04-10 NOTE — MR AVS SNAPSHOT
[address] [address] [place] [address] 
[phone] Patient: Kristy Street MRN: [id number] Visit Information Date & Time Provider Department Dept. Phone Encounter #  
 4/10/2018 11:30 AM Tony Correa MD 7353 [facility] and Internal Medicine [phone] Follow-up Instructions Return in about 1 month (around 5/10/2018), or if symptoms worsen or fail to improve, for mood, fatigue. Your Appointments 8/23/2018 11:30 AM  
ROUTINE CARE with Albert Valdez MD  
[place] [facility] Endocrinology 3651 [facility]) Appt Note: f/u diabetes cp0.00  
 330 [facility] [address] 350 [facility] 63 [facility] 7 [address] Upcoming Health Maintenance Date Due  
 MEDICARE YEARLY EXAM 9/8/2018 BREAST CANCER SCRN MAMMOGRAM 4/18/2019 GLAUCOMA SCREENING Q2Y 7/1/2019 COLONOSCOPY 5/5/2021 DTaP/Tdap/Td series (2 - Td) 12/18/2025 Allergies as of 4/10/2018  Review Complete On: 4/10/2018 By: Tony Correa MD  
  
 Severity Noted Reaction Type Reactions Mold  12/17/2015    Sneezing Current Immunizations  Reviewed on 12/13/2017 Name Date Influenza Vaccine 10/12/2017, 1/11/2017 Pneumococcal Conjugate (PCV-13) 12/18/2015 Pneumococcal Polysaccharide (PPSV-23) 9/7/2017 Tdap 12/18/2015 Zoster Vaccine, Live 2/16/2017 Not reviewed this visit You Were Diagnosed With   
  
 Codes Comments Fatigue, unspecified type    -  Primary ICD-10-CM: R53.83 ICD-9-CM: 780.79 Hyperlipidemia, unspecified hyperlipidemia type     ICD-10-CM: E78.5 ICD-9-CM: 272.4 Essential hypertension     ICD-10-CM: I10 
ICD-9-CM: 401.9 Hypothyroidism due to acquired atrophy of thyroid     ICD-10-CM: E03.4 ICD-9-CM: 244.8, 246.8 Anxiety and depression     ICD-10-CM: F41.9, F32.9 ICD-9-CM: 300.00, 311 B12 deficiency     ICD-10-CM: E53.8 ICD-9-CM: 266.2 History of Lyme disease     ICD-10-CM: Z86.19 ICD-9-CM: V12.09 Vitamin D deficiency     ICD-10-CM: E55.9 ICD-9-CM: 268.9 Hyperglycemia     ICD-10-CM: R73.9 ICD-9-CM: 790.29 Vitals BP Pulse Temp Resp Height(growth percentile) Weight(growth percentile) 138/78 (BP 1 Location: Left arm, BP Patient Position: Sitting) 70 98.6 °F (37 °C) (Oral) 16 5' (1.524 m) 138 lb 3.2 oz (62.7 kg) SpO2 BMI OB Status Smoking Status 98% 26.99 kg/m2 Postmenopausal Never Smoker BMI and BSA Data Body Mass Index Body Surface Area  
 26.99 kg/m 2 1.63 m 2 Preferred Pharmacy Pharmacy Name Phone [address] [phone] Your Updated Medication List  
  
   
This list is accurate as of 4/10/18 12:40 PM.  Always use your most recent med list. amLODIPine 10 mg tablet Commonly known as:  Andree Gonzalez TAKE 1 TABLET EVERY DAY  
  
 aspirin delayed-release 81 mg tablet Take 1 Tab by mouth daily. azelastine 137 mcg (0.1 %) nasal spray Commonly known as:  ASTELIN  
1 Spray by Both Nostrils route two (2) times a day. Use in each nostril as directed buPROPion  mg tablet Commonly known as:  Wallowa Issa Take 1 Tab by mouth every morning. CALCIUM + D PO Take  by mouth. cycloSPORINE 0.05 % ophthalmic emulsion Commonly known as:  RESTASIS Administer 1 Drop to both eyes two (2) times a day. esomeprazole 20 mg capsule Commonly known as:  NEXIUM  
TAKE 1 CAPSULE EVERY DAY AS NEEDED  
  
 fenofibrate nanocrystallized 145 mg tablet Commonly known as:  TRICOR  
TAKE 1 TABLET DAILY FOR HYPERLIPIDEMIA  
  
 ibandronate 150 mg tablet Commonly known as:  Delynn Loots TAKE 1 TABLET (150 MG) EVERY THIRTY (30) DAYS  
  
 ipratropium 0.03 % nasal spray Commonly known as:  ATROVENT  
 2 Sprays by Both Nostrils route three (3) times daily as needed for Rhinitis. levothyroxine 88 mcg tablet Commonly known as:  SYNTHROID  
TAKE 1 TABLET DAILY MOST DAYS AND 1 & 1/2 TABLETS DAILY ON SUNDAYS (7.5 TABLETS TOTAL/WEEK = 94 MCG/DAY AVERAGE DOSE) LINZESS 145 mcg Cap capsule Generic drug:  linaclotide TAKE 1 CAPSULE BY MOUTH DAILY (BEFORE BREAKFAST). lisinopril 20 mg tablet Commonly known as:  PRINIVIL, ZESTRIL  
TAKE 1 TABLET EVERY DAY  
  
 multivit-min-folic acid-biotin 26.7-9,970 mcg Tab Take  by mouth.  
  
 multivitamin tablet Commonly known as:  ONE A DAY Take 1 Tab by mouth daily. rosuvastatin 20 mg tablet Commonly known as:  CRESTOR  
TAKE 1 TABLET EVERY NIGHT  
  
 sertraline 100 mg tablet Commonly known as:  ZOLOFT  
TAKE 1 AND 1/2 TABLETS EVERY DAY  
  
 traZODone 50 mg tablet Commonly known as:  DESYREL  
TAKE 2 TABLETS EVERY NIGHT Prescriptions Sent to Pharmacy Refills buPROPion XL (WELLBUTRIN XL) 150 mg tablet 1 Sig: Take 1 Tab by mouth every morning. Class: Normal  
 Pharmacy: [address] [place] [address] #: [phone] Route: Oral  
  
Follow-up Instructions Return in about 1 month (around 5/10/2018), or if symptoms worsen or fail to improve, for mood, fatigue. To-Do List   
 Around 04/12/2018 Lab:  C REACTIVE PROTEIN, QT Around 04/12/2018 Lab:  CBC WITH AUTOMATED DIFF Around 04/12/2018 Lab:  CK Around 04/12/2018 Lab:  HEMOGLOBIN A1C WITH EAG Around 04/12/2018 Lab:  LIPID PANEL Around 04/12/2018 Lab:  LYME AB/WESTERN BLOT REFLEX Around 04/12/2018 Lab:  METABOLIC PANEL, COMPREHENSIVE Around 04/12/2018 Lab:  SED RATE (ESR) Around 04/12/2018 Lab:  T4, FREE Around 04/12/2018 Lab:  TSH 3RD GENERATION Around 04/12/2018   Lab:  VITAMIN B12   
  
 Around 04/12/2018 Lab:  VITAMIN D, 25 HYDROXY Introducing [facility]! Dear Wil Go: Thank you for requesting a [employer] account. Our records indicate that you already have an active [employer] account. You can access your account anytime at [email or web address] Did you know that you can access your hospital and ER discharge instructions at any time in [employer]? You can also review all of your test results from your hospital stay or ER visit. Additional Information If you have questions, please visit the Frequently Asked Questions section of the [employer] website at [email or web address] Remember, [employer] is NOT to be used for urgent needs. For medical emergencies, dial 911. Now available from your iPhone and Android! Please provide this summary of care documentation to your next provider. Lyme Disease Testing Disclaimer:   
 § 14.2-8393.3. (Expires July 1, 2018) Lyme disease testing information disclosure. A. Every licensee or his in-office designee who orders a laboratory test for the presence of Lyme disease shall provide to the patient or his legal representative the following written information: \"ACCORDING TO THE CENTERS FOR DISEASE CONTROL AND PREVENTION, AS OF 2011 LYME DISEASE IS THE SIXTH FASTEST GROWING DISEASE IN THE [place]. YOUR HEALTH CARE PROVIDER HAS ORDERED A LABORATORY TEST FOR THE PRESENCE OF LYME DISEASE FOR YOU. CURRENT LABORATORY TESTING FOR LYME DISEASE CAN BE PROBLEMATIC AND STANDARD LABORATORY TESTS OFTEN RESULT IN FALSE NEGATIVE AND FALSE POSITIVE RESULTS, AND IF DONE TOO EARLY, YOU MAY NOT HAVE PRODUCED ENOUGH ANTIBODIES TO BE CONSIDERED POSITIVE BECAUSE YOUR IMMUNE RESPONSE REQUIRES TIME TO DEVELOP ANTIBODIES. IF YOU ARE TESTED FOR LYME DISEASE, AND THE RESULTS ARE NEGATIVE, THIS DOES NOT NECESSARILY MEAN YOU DO NOT HAVE LYME DISEASE.  IF YOU CONTINUE TO EXPERIENCE SYMPTOMS, YOU SHOULD CONTACT YOUR HEALTH CARE PROVIDER AND INQUIRE ABOUT THE APPROPRIATENESS OF RETESTING OR ADDITIONAL TREATMENT. \"  
B. Licensees shall be immune from civil liability for the provision of the written information required by this section absent gross negligence or willful misconduct. Your primary care clinician is listed as [address] If you have any questions after today's visit, please call [phone].

## 2018-04-10 NOTE — PROGRESS NOTES
HPI:  Presents for f/u fatigue, lipids, etc    Pt reports desire to lose weight  Requests a way to improve motivation to improve diet    Pt acknowledges eating for comfort at time. Pt acknowledges significant stressors in her life. Helping her sister care for 4 grandchildren in [place]. Trazodone helps her sleep    Pt inquires re: persistent Lyme dz    Pt has not yet seen ENT    Past medical, Social, and Family history reviewed    Prior to Admission medications    Medication Sig Start Date End Date Taking? Authorizing Provider   sertraline (ZOLOFT) 100 mg tablet TAKE 1 AND 1/2 TABLETS EVERY DAY 3/19/18  Yes Guera Restrepo MD   LINZESS 145 mcg cap capsule TAKE 1 CAPSULE BY MOUTH DAILY (BEFORE BREAKFAST). 12/15/17  Yes Yulissa Barros NP   amLODIPine (NORVASC) 10 mg tablet TAKE 1 TABLET EVERY DAY 12/14/17  Yes Guera Restrepo MD   traZODone (DESYREL) 50 mg tablet TAKE 2 TABLETS EVERY NIGHT 12/14/17  Yes Guera Restrepo MD   levothyroxine (SYNTHROID) 88 mcg tablet TAKE 1 TABLET DAILY MOST DAYS AND 1 & 1/2 TABLETS DAILY ON SUNDAYS (7.5 TABLETS TOTAL/WEEK = 94 MCG/DAY AVERAGE DOSE) 12/13/17  Yes John Valdivia MD   ipratropium (ATROVENT) 0.03 % nasal spray 2 Sprays by Both Nostrils route three (3) times daily as needed for Rhinitis. 11/21/17  Yes Guera Restrepo MD   lisinopril (PRINIVIL, ZESTRIL) 20 mg tablet TAKE 1 TABLET EVERY DAY 10/25/17  Yes Guera Restrepo MD   fenofibrate nanocrystallized (TRICOR) 145 mg tablet TAKE 1 TABLET DAILY FOR HYPERLIPIDEMIA 10/20/17  Yes Guera Restrepo MD   esomeprazole (NEXIUM) 20 mg capsule TAKE 1 CAPSULE EVERY DAY AS NEEDED 10/13/17  Yes Guera Restrepo MD   azelastine (ASTELIN) 137 mcg (0.1 %) nasal spray 1 [place] by Both Nostrils route two (2) times a day.  Use in each nostril as directed 9/7/17  Yes Guera Restrepo MD   ibandronate (BONIVA) 150 mg tablet TAKE 1 TABLET (150 MG) EVERY THIRTY (30) DAYS 9/6/17  Yes Guera Restrepo MD   rosuvastatin (CRESTOR) 20 mg tablet TAKE 1 TABLET EVERY NIGHT 8/4/17  Yes Lynnette Mcclendon MD   cycloSPORINE (RESTASIS) 0.05 % ophthalmic emulsion Administer 1 Drop to both eyes two (2) times a day. Yes Historical Provider   multivitamin (ONE A DAY) tablet Take 1 Tab by mouth daily. Yes Historical Provider   multivit-min-folic acid-biotin 32.2-3,949 mcg tab Take  by mouth. Yes Historical Provider   CALCIUM CARBONATE/VITAMIN D3 (CALCIUM + D PO) Take  by mouth. Yes Historical Provider   aspirin delayed-release 81 mg tablet Take 1 Tab by mouth daily. 12/17/15  Yes Lynnette Mcclendon MD          ROS  Complete ROS reviewed and negative or stable except as noted in HPI. Physical Exam   Constitutional: She is oriented to person, place, and time. She appears well-nourished. No distress. HENT:   Head: Normocephalic and atraumatic. Right Ear: Tympanic membrane normal.   Left Ear: Tympanic membrane normal. Left ear middle ear effusion: serous. Eyes: EOM are normal. Pupils are equal, round, and reactive to light. No scleral icterus. Neck: Normal range of motion. Neck supple. No JVD present. Cardiovascular: Normal rate, regular rhythm and normal heart sounds. Pulmonary/Chest: Effort normal and breath sounds normal. No respiratory distress. She has no wheezes. She has no rales. Abdominal: Soft. She exhibits no distension. There is no tenderness. Musculoskeletal: Normal range of motion. She exhibits no edema. Lymphadenopathy:     She has no cervical adenopathy. Neurological: She is alert and oriented to person, place, and time. She exhibits normal muscle tone. Skin: Skin is warm. No rash noted. Psychiatric: She has a normal mood and affect. Nursing note and vitals reviewed. Prior labs reviewed. Assessment/Plan:    ICD-10-CM ICD-9-CM    1. Fatigue, unspecified type R53.83 780.79 CBC WITH AUTOMATED DIFF      SED RATE (ESR)      C REACTIVE PROTEIN, QT   2.  Hyperlipidemia, unspecified hyperlipidemia type E78.5 272.4 CK      LIPID PANEL      METABOLIC PANEL, COMPREHENSIVE   3. Essential hypertension I10 401.9 CBC WITH AUTOMATED DIFF   4. Hypothyroidism due to acquired atrophy of thyroid E03.4 244.8 T4, FREE     246.8 TSH 3RD GENERATION   5. Anxiety and depression F41.9 300.00 buPROPion XL (WELLBUTRIN XL) 150 mg tablet    F32.9 311 DISCONTINUED: buPROPion XL (WELLBUTRIN XL) 150 mg tablet   6. B12 deficiency E53.8 266.2 VITAMIN B12   7. History of Lyme disease Z86.19 V12.09 LYME AB/WESTERN BLOT REFLEX   8. Vitamin D deficiency E55.9 268.9 VITAMIN D, 25 HYDROXY   9. Hyperglycemia R73.9 790.29 HEMOGLOBIN A1C WITH EAG     Follow-up Disposition:  Return in about 1 month (around 5/10/2018), or if symptoms worsen or fail to improve, for mood, fatigue.    results and schedule of future studies reviewed with patient  reviewed diet, exercise and weight   cardiovascular risk and specific lipid/LDL goals reviewed  reviewed medications and side effects in detail   Add wellbutrin to cut cravings and improve mood, motivation   Agree to Lyme titers  If abnormal lyme titers then ref to ID specialist to review further  Other labs - pt to return fasting  Encouraged ENT eval

## 2018-04-10 NOTE — PROGRESS NOTES
Rm 13    Chief Complaint   Patient presents with    Follow-up     cholesterol and thyroid    Weight Management     pt wants to lose weight, would like some type of diet plan.  Fatigue     really tired, pt states she had lyme disease 10 years ago, wondering if there is a blood test to check to see if its back    Stiff Neck     left side, little knot there per pt     1. Have you been to the ER, urgent care clinic since your last visit? Hospitalized since your last visit? No    2. Have you seen or consulted any other health care providers outside of the [facility] since your last visit? Include any pap smears or colon screening. No    There are no preventive care reminders to display for this patient. Fall Risk Assessment, last 12 mths 9/7/2017   Able to walk? Yes   Fall in past 12 months?  No       Learning Assessment 1/9/2018   PRIMARY LEARNER Patient   HIGHEST LEVEL OF EDUCATION - PRIMARY LEARNER  GRADUATED HIGH SCHOOL OR GED   BARRIERS PRIMARY LEARNER NONE   CO-LEARNER CAREGIVER No   PRIMARY LANGUAGE ENGLISH   LEARNER PREFERENCE PRIMARY READING   ANSWERED BY patient   RELATIONSHIP SELF

## 2018-04-12 ENCOUNTER — LAB ONLY (OUTPATIENT)
Dept: INTERNAL MEDICINE CLINIC | Age: 68
End: 2018-04-12

## 2018-04-12 DIAGNOSIS — E55.9 VITAMIN D DEFICIENCY: ICD-10-CM

## 2018-04-12 DIAGNOSIS — E78.5 HYPERLIPIDEMIA, UNSPECIFIED HYPERLIPIDEMIA TYPE: ICD-10-CM

## 2018-04-12 DIAGNOSIS — Z86.19 HISTORY OF LYME DISEASE: ICD-10-CM

## 2018-04-12 DIAGNOSIS — E03.4 HYPOTHYROIDISM DUE TO ACQUIRED ATROPHY OF THYROID: ICD-10-CM

## 2018-04-12 DIAGNOSIS — E53.8 B12 DEFICIENCY: ICD-10-CM

## 2018-04-12 DIAGNOSIS — R73.9 HYPERGLYCEMIA: ICD-10-CM

## 2018-04-12 DIAGNOSIS — R53.83 FATIGUE, UNSPECIFIED TYPE: ICD-10-CM

## 2018-04-12 DIAGNOSIS — I10 ESSENTIAL HYPERTENSION: ICD-10-CM

## 2018-04-13 LAB
25(OH)D3+25(OH)D2 SERPL-MCNC: 39.2 NG/ML (ref 30–100)
ALBUMIN SERPL-MCNC: 4.5 G/DL (ref 3.6–4.8)
ALBUMIN/GLOB SERPL: 2.1 {RATIO} (ref 1.2–2.2)
ALP SERPL-CCNC: 27 IU/L (ref 39–117)
ALT SERPL-CCNC: 16 IU/L (ref 0–32)
AST SERPL-CCNC: 26 IU/L (ref 0–40)
B BURGDOR IGG+IGM SER-ACNC: <0.91 ISR (ref 0–0.9)
B BURGDOR IGM SER IA-ACNC: <0.8 INDEX (ref 0–0.79)
BASOPHILS # BLD AUTO: 0 X10E3/UL (ref 0–0.2)
BASOPHILS NFR BLD AUTO: 1 %
BILIRUB SERPL-MCNC: 0.4 MG/DL (ref 0–1.2)
BUN SERPL-MCNC: 23 MG/DL (ref 8–27)
BUN/CREAT SERPL: 24 (ref 12–28)
CALCIUM SERPL-MCNC: 10.1 MG/DL (ref 8.7–10.3)
CHLORIDE SERPL-SCNC: 101 MMOL/L (ref 96–106)
CHOLEST SERPL-MCNC: 146 MG/DL (ref 100–199)
CK SERPL-CCNC: 61 U/L (ref 24–173)
CO2 SERPL-SCNC: 27 MMOL/L (ref 18–29)
CREAT SERPL-MCNC: 0.95 MG/DL (ref 0.57–1)
CRP SERPL-MCNC: 1.6 MG/L (ref 0–4.9)
EOSINOPHIL # BLD AUTO: 0.2 X10E3/UL (ref 0–0.4)
EOSINOPHIL NFR BLD AUTO: 3 %
ERYTHROCYTE [DISTWIDTH] IN BLOOD BY AUTOMATED COUNT: 14.3 % (ref 12.3–15.4)
ERYTHROCYTE [SEDIMENTATION RATE] IN BLOOD BY WESTERGREN METHOD: 2 MM/HR (ref 0–40)
EST. AVERAGE GLUCOSE BLD GHB EST-MCNC: 114 MG/DL
GLOBULIN SER CALC-MCNC: 2.1 G/DL (ref 1.5–4.5)
GLUCOSE SERPL-MCNC: 87 MG/DL (ref 65–99)
HBA1C MFR BLD: 5.6 % (ref 4.8–5.6)
HCT VFR BLD AUTO: 37.6 % (ref 34–46.6)
HDLC SERPL-MCNC: 62 MG/DL
HGB BLD-MCNC: 12.5 G/DL (ref 11.1–15.9)
IMM GRANULOCYTES # BLD: 0 X10E3/UL (ref 0–0.1)
IMM GRANULOCYTES NFR BLD: 0 %
LDLC SERPL CALC-MCNC: 63 MG/DL (ref 0–99)
LYMPHOCYTES # BLD AUTO: 1.2 X10E3/UL (ref 0.7–3.1)
LYMPHOCYTES NFR BLD AUTO: 27 %
MCH RBC QN AUTO: 28.5 PG (ref 26.6–33)
MCHC RBC AUTO-ENTMCNC: 33.2 G/DL (ref 31.5–35.7)
MCV RBC AUTO: 86 FL (ref 79–97)
MONOCYTES # BLD AUTO: 0.5 X10E3/UL (ref 0.1–0.9)
MONOCYTES NFR BLD AUTO: 12 %
NEUTROPHILS # BLD AUTO: 2.5 X10E3/UL (ref 1.4–7)
NEUTROPHILS NFR BLD AUTO: 57 %
PLATELET # BLD AUTO: 339 X10E3/UL (ref 150–379)
POTASSIUM SERPL-SCNC: 4.4 MMOL/L (ref 3.5–5.2)
PROT SERPL-MCNC: 6.6 G/DL (ref 6–8.5)
RBC # BLD AUTO: 4.38 X10E6/UL (ref 3.77–5.28)
SODIUM SERPL-SCNC: 142 MMOL/L (ref 134–144)
T4 FREE SERPL-MCNC: 1.49 NG/DL (ref 0.82–1.77)
TRIGL SERPL-MCNC: 107 MG/DL (ref 0–149)
TSH SERPL DL<=0.005 MIU/L-ACNC: 2.26 UIU/ML (ref 0.45–4.5)
VIT B12 SERPL-MCNC: 613 PG/ML (ref 232–1245)
VLDLC SERPL CALC-MCNC: 21 MG/DL (ref 5–40)
WBC # BLD AUTO: 4.4 X10E3/UL (ref 3.4–10.8)

## 2018-04-14 NOTE — PROGRESS NOTES
Labs are all normal.  No evidence of Lyme. Keep up the good work!    Continue your current medications

## 2018-04-16 ENCOUNTER — TELEPHONE (OUTPATIENT)
Dept: INTERNAL MEDICINE CLINIC | Age: 68
End: 2018-04-16

## 2018-05-01 ENCOUNTER — HOSPITAL ENCOUNTER (OUTPATIENT)
Dept: MAMMOGRAPHY | Age: 68
Discharge: HOME | End: 2018-05-01

## 2018-05-01 DIAGNOSIS — Z12.31 VISIT FOR SCREENING MAMMOGRAM: ICD-10-CM

## 2018-05-01 PROCEDURE — 77067 SCR MAMMO BI INCL CAD: CPT

## 2018-05-02 DIAGNOSIS — E78.5 HYPERLIPIDEMIA, UNSPECIFIED HYPERLIPIDEMIA TYPE: ICD-10-CM

## 2018-05-02 RX ORDER — AMLODIPINE BESYLATE 10 MG/1
TABLET ORAL
Qty: 90 TAB | Refills: 1 | Status: SHIPPED | OUTPATIENT
Start: 2018-05-02 | End: 2018-10-12 | Stop reason: SDUPTHER

## 2018-05-03 RX ORDER — ROSUVASTATIN CALCIUM 20 MG/1
TABLET, COATED ORAL
Qty: 90 TAB | Refills: 3 | Status: SHIPPED | OUTPATIENT
Start: 2018-05-03 | End: 2019-06-05 | Stop reason: SDUPTHER

## 2018-05-10 ENCOUNTER — OFFICE VISIT (OUTPATIENT)
Dept: INTERNAL MEDICINE CLINIC | Age: 68
End: 2018-05-10

## 2018-05-10 VITALS
SYSTOLIC BLOOD PRESSURE: 133 MMHG | TEMPERATURE: 98 F | HEART RATE: 68 BPM | HEIGHT: 60 IN | DIASTOLIC BLOOD PRESSURE: 69 MMHG | WEIGHT: 136 LBS | OXYGEN SATURATION: 98 % | RESPIRATION RATE: 16 BRPM | BODY MASS INDEX: 26.7 KG/M2

## 2018-05-10 DIAGNOSIS — F32.A ANXIETY AND DEPRESSION: ICD-10-CM

## 2018-05-10 DIAGNOSIS — E78.5 HYPERLIPIDEMIA, UNSPECIFIED HYPERLIPIDEMIA TYPE: ICD-10-CM

## 2018-05-10 DIAGNOSIS — I10 ESSENTIAL HYPERTENSION: ICD-10-CM

## 2018-05-10 DIAGNOSIS — J30.2 OTHER SEASONAL ALLERGIC RHINITIS: ICD-10-CM

## 2018-05-10 DIAGNOSIS — F41.9 ANXIETY AND DEPRESSION: ICD-10-CM

## 2018-05-10 DIAGNOSIS — F33.9 RECURRENT DEPRESSION (HCC): Primary | ICD-10-CM

## 2018-05-10 RX ORDER — BUPROPION HYDROCHLORIDE 75 MG/1
75 TABLET ORAL 2 TIMES DAILY
Qty: 60 TAB | Refills: 5 | Status: SHIPPED | OUTPATIENT
Start: 2018-05-10 | End: 2018-07-20

## 2018-05-10 RX ORDER — SERTRALINE HYDROCHLORIDE 100 MG/1
200 TABLET, FILM COATED ORAL DAILY
Qty: 180 TAB | Refills: 1 | Status: SHIPPED | OUTPATIENT
Start: 2018-05-10 | End: 2018-10-12 | Stop reason: SDUPTHER

## 2018-05-10 NOTE — PROGRESS NOTES
Dorthy Jeans is a 79 y.o. female Room#14    Chief Complaint   Patient presents with    Fatigue     follow up    Depression     follow up    Anxiety     follow up     1. Have you been to the ER, urgent care clinic since your last visit? Hospitalized since your last visit? No    2. Have you seen or consulted any other health care providers outside of the [facility] since your last visit? Include any pap smears or colon screening. No     There are no preventive care reminders to display for this patient.

## 2018-05-10 NOTE — MR AVS SNAPSHOT
[address] [facility] [address] 
[phone] Patient: Rayray Gonzales MRN: [id number] RADHA:99[birth year]04/[birth year]70 Visit Information Date & Time Provider Department Dept. Phone Encounter #  
 5/10/2018 11:45 AM Geno Ta MD [facility] Pediatrics and Internal Medicine [phone] [id number] Follow-up Instructions Return in about 6 weeks (around 6/21/2018), or if symptoms worsen or fail to improve, for mood. Your Appointments 8/23/2018 11:30 AM  
ROUTINE CARE with Stefano Fleischer, MD  
[facility] [address] [facility]) Appt Note: f/u diabetes cp0.00  
 [address] [facility] [address] [place] [address]  
[id number] [place] [address] Upcoming Health Maintenance Date Due Influenza Age 5 to Adult 8/1/2018 MEDICARE YEARLY EXAM 9/8/2018 GLAUCOMA SCREENING Q2Y 7/1/2019 BREAST CANCER SCRN MAMMOGRAM 5/1/2020 COLONOSCOPY 5/5/2021 DTaP/Tdap/Td series (2 - Td) 12/18/2025 Allergies as of 5/10/2018  Review Complete On: 5/10/2018 By: Geno Ta MD  
  
 Severity Noted Reaction Type Reactions Mold  12/17/2015    Sneezing Wellbutrin [Bupropion Hcl]  04/30/2018    Nausea Only And dizziness Current Immunizations  Reviewed on 12/13/2017 Name Date Influenza Vaccine 10/12/2017, 1/11/2017 Pneumococcal Conjugate (PCV-13) 12/18/2015 Pneumococcal Polysaccharide (PPSV-23) 9/7/2017 Tdap 12/18/2015 Zoster Vaccine, Live 2/16/2017 Not reviewed this visit You Were Diagnosed With   
  
 Codes Comments Recurrent depression ([facility]    -  Primary ICD-10-CM: F33.9 ICD-9-CM: 296.30 Anxiety and depression     ICD-10-CM: F41.9, F32.9 ICD-9-CM: 300.00, 311 Essential hypertension     ICD-10-CM: I10 
ICD-9-CM: 401.9 Hyperlipidemia, unspecified hyperlipidemia type     ICD-10-CM: E78.5 ICD-9-CM: 272.4 Other seasonal allergic rhinitis     ICD-10-CM: J30.2 ICD-9-CM: 477.8 Vitals BP Pulse Temp Resp Height(growth percentile) Weight(growth percentile) 133/69 (BP 1 Location: Right arm, BP Patient Position: Sitting) 68 98 °F (36.7 °C) (Oral) 16 5' (1.524 m) 136 lb (61.7 kg) SpO2 BMI OB Status Smoking Status 98% 26.56 kg/m2 Postmenopausal Never Smoker BMI and BSA Data Body Mass Index Body Surface Area  
 26.56 kg/m 2 1.62 m 2 Preferred Pharmacy Pharmacy Name Phone [facility] [address], [address] [phone] Your Updated Medication List  
  
   
This list is accurate as of 5/10/18  1:27 PM.  Always use your most recent med list.  
  
  
  
  
 ALPRAZolam 0.25 mg tablet Commonly known as:  Darus Breeding Take 1 Tab by mouth two (2) times daily as needed for Anxiety. Max Daily Amount: 0.5 mg.  
  
 amLODIPine 10 mg tablet Commonly known as:  Caralee Dupes TAKE 1 TABLET EVERY DAY  
  
 aspirin delayed-release 81 mg tablet Take 1 Tab by mouth daily. azelastine 137 mcg (0.1 %) nasal spray Commonly known as:  ASTELIN  
1 Spray by Both Nostrils route two (2) times a day. Use in each nostril as directed buPROPion 75 mg tablet Commonly known as:  [facility] Take 1 Tab by mouth two (2) times a day. CALCIUM + D PO Take  by mouth. cycloSPORINE 0.05 % ophthalmic emulsion Commonly known as:  RESTASIS Administer 1 Drop to both eyes two (2) times a day. esomeprazole 20 mg capsule Commonly known as:  NEXIUM  
TAKE 1 CAPSULE EVERY DAY AS NEEDED  
  
 fenofibrate nanocrystallized 145 mg tablet Commonly known as:  TRICOR  
TAKE 1 TABLET DAILY FOR HYPERLIPIDEMIA  
  
 ibandronate 150 mg tablet Commonly known as:  Seretha [place] TAKE 1 TABLET (150 MG) EVERY THIRTY (30) DAYS  
  
 ipratropium 0.03 % nasal spray Commonly known as:  ATROVENT  
 2 Sprays by Both Nostrils route three (3) times daily as needed for Rhinitis. levothyroxine 88 mcg tablet Commonly known as:  SYNTHROID  
TAKE 1 TABLET DAILY MOST DAYS AND 1 & 1/2 TABLETS DAILY ON SUNDAYS (7.5 TABLETS TOTAL/WEEK = 94 MCG/DAY AVERAGE DOSE) LINZESS 145 mcg Cap capsule Generic drug:  linaclotide TAKE 1 CAPSULE BY MOUTH DAILY (BEFORE BREAKFAST). lisinopril 20 mg tablet Commonly known as:  PRINIVIL, ZESTRIL  
TAKE 1 TABLET EVERY DAY  
  
 multivit-min-folic acid-biotin 96.0-7,767 mcg Tab Take  by mouth.  
  
 multivitamin tablet Commonly known as:  ONE A DAY Take 1 Tab by mouth daily. rosuvastatin 20 mg tablet Commonly known as:  CRESTOR  
TAKE 1 TABLET EVERY NIGHT  
  
 sertraline 100 mg tablet Commonly known as:  ZOLOFT Take 2 Tabs by mouth daily. traZODone 50 mg tablet Commonly known as:  DESYREL  
TAKE 2 TABLETS EVERY NIGHT Prescriptions Sent to Pharmacy Refills  
 sertraline (ZOLOFT) 100 mg tablet 1 Sig: Take 2 Tabs by mouth daily. Class: Normal  
 Pharmacy: [address] [place] [address] Ph #: [phone] Route: Oral  
 buPROPion (WELLBUTRIN) 75 mg tablet 5 Sig: Take 1 Tab by mouth two (2) times a day. Class: Normal  
 Pharmacy: Trini Eddy [address] [place] [address] Ph #: [phone] Route: Oral  
  
Follow-up Instructions Return in about 6 weeks (around 6/21/2018), or if symptoms worsen or fail to improve, for mood. Introducing [facility] & HEALTH SERVICES! Dear Emmy Toledo: Thank you for requesting a AFreeze account. Our records indicate that you already have an active AFreeze account. You can access your account anytime at [email or web address] Did you know that you can access your hospital and ER discharge instructions at any time in AFreeze? You can also review all of your test results from your hospital stay or ER visit. Additional Information If you have questions, please visit the Frequently Asked Questions section of the Friends[employer] website at [email or web address] Remember, CreaWor is NOT to be used for urgent needs. For medical emergencies, dial 911. Now available from your iPhone and Android! Please provide this summary of care documentation to your next provider. Your primary care clinician is listed as [address] If you have any questions after today's visit, please call [phone].

## 2018-05-10 NOTE — PROGRESS NOTES
HPI:  Presents for f/u mood, weight, etc    Pt reports mood is at least stable  But, not ideal    Did not tolerate wellbutrin XL - severe dizziness and unsteadiness  Resolved off med    Pt just increased zoloft in the past couple of days    C/o neck pain and tension  Getting acupuncture with +benefit    Past medical, Social, and Family history reviewed    Prior to Admission medications    Medication Sig Start Date End Date Taking? Authorizing Provider   rosuvastatin (CRESTOR) 20 mg tablet TAKE 1 TABLET EVERY NIGHT 5/3/18  Yes Charmaine Hall MD   amLODIPine (NORVASC) 10 mg tablet TAKE 1 TABLET EVERY DAY 5/2/18  Yes Shant Jones MD   ALPRAZolam Heraclio Xu) 0.25 mg tablet Take 1 Tab by mouth two (2) times daily as needed for Anxiety. Max Daily Amount: 0.5 mg. 5/1/18  Yes Shant Jones MD   sertraline (ZOLOFT) 100 mg tablet TAKE 1 AND 1/2 TABLETS EVERY DAY 3/19/18  Yes Shant Jones MD   LINZESS 145 mcg cap capsule TAKE 1 CAPSULE BY MOUTH DAILY (BEFORE BREAKFAST). 12/15/17  Yes Radha Robertson NP   traZODone (DESYREL) 50 mg tablet TAKE 2 TABLETS EVERY NIGHT 12/14/17  Yes Shant Jones MD   levothyroxine (SYNTHROID) 88 mcg tablet TAKE 1 TABLET DAILY MOST DAYS AND 1 & 1/2 TABLETS DAILY ON SUNDAYS (7.5 TABLETS TOTAL/WEEK = 94 MCG/DAY AVERAGE DOSE) 12/13/17  Yes Ekaterina Barron MD   ipratropium (ATROVENT) 0.03 % nasal spray 2 Sprays by Both Nostrils route three (3) times daily as needed for Rhinitis. 11/21/17  Yes Shant Jones MD   lisinopril (PRINIVIL, ZESTRIL) 20 mg tablet TAKE 1 TABLET EVERY DAY 10/25/17  Yes Shant Jones MD   fenofibrate nanocrystallized (TRICOR) 145 mg tablet TAKE 1 TABLET DAILY FOR HYPERLIPIDEMIA 10/20/17  Yes Shant Jones MD   esomeprazole (NEXIUM) 20 mg capsule TAKE 1 CAPSULE EVERY DAY AS NEEDED 10/13/17  Yes Shant Jones MD   azelastine (ASTELIN) 137 mcg (0.1 %) nasal spray 1 [place] by Both Nostrils route two (2) times a day.  Use in each nostril as directed 9/7/17 Yes Erich Gillis MD   ibandronate (BONIVA) 150 mg tablet TAKE 1 TABLET (150 MG) EVERY THIRTY (30) DAYS 9/6/17  Yes Erich Gillis MD   multivitamin (ONE A DAY) tablet Take 1 Tab by mouth daily. Yes Historical Provider   multivit-min-folic acid-biotin 84.2-4,467 mcg tab Take  by mouth. Yes Historical Provider   CALCIUM CARBONATE/VITAMIN D3 (CALCIUM + D PO) Take  by mouth. Yes Historical Provider   aspirin delayed-release 81 mg tablet Take 1 Tab by mouth daily. 12/17/15  Yes Daly Avila MD   cycloSPORINE (RESTASIS) 0.05 % ophthalmic emulsion Administer 1 Drop to both eyes two (2) times a day. Historical Provider          ROS  Complete ROS reviewed and negative or stable except as noted in HPI. Physical Exam   Constitutional: She is oriented to person, place, and time. She appears well-nourished. No distress. HENT:   Head: Normocephalic and atraumatic. Right Ear: Tympanic membrane normal.   Eyes: EOM are normal. Pupils are equal, round, and reactive to light. No scleral icterus. Neck: Normal range of motion. Neck supple. No JVD present. Cardiovascular: Normal rate, regular rhythm and normal heart sounds. Pulmonary/Chest: Effort normal and breath sounds normal. No respiratory distress. She has no wheezes. She has no rales. Abdominal: Soft. She exhibits no distension. There is no tenderness. Musculoskeletal: Normal range of motion. She exhibits no edema. Lymphadenopathy:     She has no cervical adenopathy. Neurological: She is alert and oriented to person, place, and time. She exhibits normal muscle tone. Skin: Skin is warm. No rash noted. Psychiatric: She has a normal mood and affect. Nursing note and vitals reviewed. Prior labs reviewed. Assessment/Plan:    ICD-10-CM ICD-9-CM    1. Recurrent depression (HCC) F33.9 296.30 buPROPion (WELLBUTRIN) 75 mg tablet   2.  Anxiety and depression F41.9 300.00 sertraline (ZOLOFT) 100 mg tablet    F32.9 311 buPROPion [facility]) 75 mg tablet   3. Essential hypertension I10 401.9    4. Hyperlipidemia, unspecified hyperlipidemia type E78.5 272.4    5. Other seasonal allergic rhinitis J30.2 477.8      Follow-up Disposition:  Return in about 6 weeks (around 6/21/2018), or if symptoms worsen or fail to improve, for mood. results and schedule of future studies reviewed with patient  reviewed diet, exercise and weight  cardiovascular risk and specific lipid/LDL goals reviewed  reviewed medications and side effects in detail   Trial of lower dose, IR wellbutrin to see if tolerates better  May even consider 0.5 tab bid   Encouraged counseling  May need to see psychiatry if medical management still limited.

## 2018-07-11 ENCOUNTER — TELEPHONE (OUTPATIENT)
Dept: INTERNAL MEDICINE CLINIC | Age: 68
End: 2018-07-11

## 2018-07-11 DIAGNOSIS — E78.5 HYPERLIPIDEMIA, UNSPECIFIED HYPERLIPIDEMIA TYPE: ICD-10-CM

## 2018-07-11 DIAGNOSIS — I10 ESSENTIAL HYPERTENSION: Primary | ICD-10-CM

## 2018-07-11 NOTE — TELEPHONE ENCOUNTER
The diabetes treatment center dieticians will now provide medical nutrition therapy.     Referral order placed    Provide pt with contact info for the diabetes treatment center at [facility]

## 2018-07-11 NOTE — TELEPHONE ENCOUNTER
----- Message from Shahbaz Tafoya sent at 7/11/2018 12:41 PM EDT -----  Regarding: /Telephone  Pt stated her insurance will approve her to see a nutrionist, but would like her to change her pcp, but she decline to change, and would like to know if the doctor could recommend a nutrionist for her to see. Best contact number [phone].

## 2018-07-12 NOTE — TELEPHONE ENCOUNTER
Spoke with patient after verifying name and  regarding Dr. Roosevelt Salcedo recommendations. Writer informed patient of Dr. Roosevelt Salcedo recommendations. Patient given an opportunity to ask questions, repeated information, and verbalized understanding.

## 2018-07-20 ENCOUNTER — OFFICE VISIT (OUTPATIENT)
Dept: INTERNAL MEDICINE CLINIC | Age: 68
End: 2018-07-20

## 2018-07-20 VITALS
HEIGHT: 60 IN | WEIGHT: 132.2 LBS | RESPIRATION RATE: 18 BRPM | TEMPERATURE: 98.5 F | BODY MASS INDEX: 25.95 KG/M2 | SYSTOLIC BLOOD PRESSURE: 125 MMHG | HEART RATE: 72 BPM | DIASTOLIC BLOOD PRESSURE: 83 MMHG

## 2018-07-20 DIAGNOSIS — J30.0 CHRONIC VASOMOTOR RHINITIS: Primary | ICD-10-CM

## 2018-07-20 DIAGNOSIS — F33.9 RECURRENT DEPRESSION (HCC): ICD-10-CM

## 2018-07-20 DIAGNOSIS — J01.01 ACUTE RECURRENT MAXILLARY SINUSITIS: ICD-10-CM

## 2018-07-20 DIAGNOSIS — I10 ESSENTIAL HYPERTENSION: ICD-10-CM

## 2018-07-20 RX ORDER — FLUTICASONE PROPIONATE 50 MCG
2 SPRAY, SUSPENSION (ML) NASAL DAILY
Qty: 3 BOTTLE | Refills: 3 | Status: SHIPPED | OUTPATIENT
Start: 2018-07-20 | End: 2019-05-13 | Stop reason: SDUPTHER

## 2018-07-20 RX ORDER — AZITHROMYCIN 250 MG/1
TABLET, FILM COATED ORAL
Qty: 6 TAB | Refills: 0 | Status: SHIPPED | OUTPATIENT
Start: 2018-07-20 | End: 2018-10-09 | Stop reason: SDUPTHER

## 2018-07-20 RX ORDER — FLUTICASONE PROPIONATE 50 MCG
2 SPRAY, SUSPENSION (ML) NASAL DAILY
Qty: 1 BOTTLE | Refills: 5 | Status: SHIPPED | OUTPATIENT
Start: 2018-07-20 | End: 2018-07-20 | Stop reason: SDUPTHER

## 2018-07-20 NOTE — MR AVS SNAPSHOT
[facility] [address] [place] [address] 
[phone] Patient: Hannah Henson MRN: [id number] HOJ:[birth year] Visit Information Date & Time Provider Department Dept. Phone Encounter #  
 7/20/2018 10:45 AM Waylon Muse MD [facility] Pediatrics and Internal Medicine [phone] [id number] Follow-up Instructions Return in about 2 months (around 9/20/2018), or if symptoms worsen or fail to improve, for Medicare Wellness Visit, cholesterol, blood pressure. Your Appointments 8/23/2018 11:30 AM  
ROUTINE CARE with Verenice Gonzalez MD  
[facility] [address] [facility]) Appt Note: f/u diabetes cp0.00  
 [address] [facility] [address] [address] Upcoming Health Maintenance Date Due Influenza Age 5 to Adult 8/1/2018 MEDICARE YEARLY EXAM 9/8/2018 GLAUCOMA SCREENING Q2Y 7/1/2019 BREAST CANCER SCRN MAMMOGRAM 5/1/2020 COLONOSCOPY 5/5/2021 DTaP/Tdap/Td series (2 - Td) 12/18/2025 Allergies as of 7/20/2018  Review Complete On: 7/20/2018 By: Waylon Muse MD  
  
 Severity Noted Reaction Type Reactions Mold  12/17/2015    Sneezing Wellbutrin [Bupropion Hcl]  04/30/2018    Nausea Only And dizziness Current Immunizations  Reviewed on 12/13/2017 Name Date Influenza Vaccine 10/12/2017, 1/11/2017 Pneumococcal Conjugate (PCV-13) 12/18/2015 Pneumococcal Polysaccharide (PPSV-23) 9/7/2017 Tdap 12/18/2015 Zoster Vaccine, Live 2/16/2017 Not reviewed this visit You Were Diagnosed With   
  
 Codes Comments Chronic vasomotor rhinitis    -  Primary ICD-10-CM: J30.0 ICD-9-CM: 477.9 Acute recurrent maxillary sinusitis     ICD-10-CM: J01.01 
ICD-9-CM: 461.0 Rec[facility] ([facility]     ICD-10-CM: F33.9 ICD-9-CM: 296.30 Essential hypertension     ICD-10-CM: I10 
ICD-9-CM: 401.9 Vitals BP Pulse Temp Resp Height(growth percentile) Weight(growth percentile) 125/83 (BP 1 Location: Left arm, BP Patient Position: Sitting) 72 98.5 °F (36.9 °C) (Oral) 18 5' (1.524 m) 132 lb 3.2 oz (60 kg) BMI OB Status Smoking Status 25.82 kg/m2 Postmenopausal Never Smoker Vitals History BMI and BSA Data Body Mass Index Body Surface Area  
 25.82 kg/m 2 1.59 m 2 Preferred Pharmacy Pharmacy Name Phone [facility] Dexter [address] [place] [address] [address] [phone] Your Updated Medication List  
  
   
This list is accurate as of 7/20/18 12:16 PM.  Always use your most recent med list.  
  
  
  
  
 ALPRAZolam 0.25 mg tablet Commonly known as:  Star Carp Take 1 Tab by mouth two (2) times daily as needed for Anxiety. Max Daily Amount: 0.5 mg.  
  
 amLODIPine 10 mg tablet Commonly known as:  [place] TAKE 1 TABLET EVERY DAY  
  
 aspirin delayed-release 81 mg tablet Take 1 Tab by mouth daily. azelastine 137 mcg (0.1 %) nasal spray Commonly known as:  ASTELIN  
1 Spray by Both Nostrils route two (2) times a day. Use in each nostril as directed  
  
 azithromycin 250 mg tablet Commonly known as:  [place] Yessi Take 2 tablets today, then take 1 tablet daily CALCIUM + D PO Take  by mouth. cycloSPORINE 0.05 % ophthalmic emulsion Commonly known as:  RESTASIS Administer 1 Drop to both eyes two (2) times a day. esomeprazole 20 mg capsule Commonly known as:  NEXIUM  
TAKE 1 CAPSULE EVERY DAY AS NEEDED  
  
 fenofibrate nanocrystallized 145 mg tablet Commonly known as:  TRICOR  
TAKE 1 TABLET DAILY FOR HYPERLIPIDEMIA  
  
 fluticasone 50 mcg/actuation nasal spray Commonly known as:  Sydney Jordan 2 Sprays by Both Nostrils route daily. HAIR,SKIN AND NAILS PO Take  by mouth. ibandronate 150 mg tablet Commonly known as:  Lex Brown TAKE 1 TABLET (150 MG) EVERY THIRTY (30) DAYS  
  
 ipratropium 0.03 % nasal spray Commonly known as:  ATROVENT  
2 Sprays by Both Nostrils route three (3) times daily as needed for Rhinitis. levothyroxine 88 mcg tablet Commonly known as:  SYNTHROID  
TAKE 1 TABLET DAILY MOST DAYS AND 1 & 1/2 TABLETS DAILY ON SUNDAYS (7.5 TABLETS TOTAL/WEEK = 94 MCG/DAY AVERAGE DOSE) LINZESS 145 mcg Cap capsule Generic drug:  linaclotide TAKE 1 CAPSULE BY MOUTH DAILY (BEFORE BREAKFAST). lisinopril 20 mg tablet Commonly known as:  PRINIVIL, ZESTRIL  
TAKE 1 TABLET EVERY DAY  
  
 multivit-min-folic acid-biotin 23.7-4,150 mcg Tab Take  by mouth.  
  
 multivitamin tablet Commonly known as:  ONE A DAY Take 1 Tab by mouth daily. rosuvastatin 20 mg tablet Commonly known as:  CRESTOR  
TAKE 1 TABLET EVERY NIGHT  
  
 sertraline 100 mg tablet Commonly known as:  ZOLOFT Take 2 Tabs by mouth daily. traZODone 50 mg tablet Commonly known as:  DESYREL  
TAKE 2 TABLETS EVERY NIGHT Prescriptions Sent to Pharmacy Refills  
 azithromycin (ZITHROMAX) 250 mg tablet 0 Sig: Take 2 tablets today, then take 1 tablet daily Class: Normal  
 Pharmacy: Consuelo Correa [address] [place] [address] Ph #: [phone]  
 fluticasone (FLONASE) 50 mcg/actuation nasal spray 3 Si Sprays by Both Nostrils route daily. Class: Normal  
 Pharmacy: [address] [place] [address], [address] [place] [address] Ph #: [phone] Route: Both Nostrils Follow-up Instructions Return in about 2 months (around 2018), or if symptoms worsen or fail to improve, for Medicare Wellness Visit, cholesterol, blood pressure. Introducing \[facility]\"" & HEALTH SERVICES! Dear Apollo Henleyoter: Thank you for requesting a Lolabox account. Our records indicate that you already have an active Lolabox account.   You can access your account anytime at [email or web address] Did you know that you can access your hospital and ER discharge instructions at any time in [employer]? You can also review all of your test results from your hospital stay or ER visit. Additional Information If you have questions, please visit the Frequently Asked Questions section of the [employer] website at [email or web address] ([email or web address] Remember, [employer] is NOT to be used for urgent needs. For medical emergencies, dial 911. Now available from your iPhone and Android! Please provide this summary of care documentation to your next provider. Your primary care clinician is listed as [address] If you have any questions after today's visit, please call [phone].

## 2018-07-20 NOTE — PROGRESS NOTES
HPI:  Presents for f/u resp sx    Pt went to [place] and visited with grandchildren  GI sx related to AGE illness that the children also had    Then, increased nasal congestion, cough x 1 week  OTC meds help some   +/- fever  Increased HA and sinus pressure developing      Pt took wellbutrin x 1-2 weeks  Felt better  Then, after visiting grandchildren mood is reported better  Stopped wellbutrin. Feeling OK at the moment. Past medical, Social, and Family history reviewed    Prior to Admission medications    Medication Sig Start Date End Date Taking? Authorizing Provider   multivitamin with minerals (HAIR,SKIN AND NAILS PO) Take  by mouth. Yes Historical Provider   sertraline (ZOLOFT) 100 mg tablet Take 2 Tabs by mouth daily. 5/10/18  Yes Monse García MD   rosuvastatin (CRESTOR) 20 mg tablet TAKE 1 TABLET EVERY NIGHT 5/3/18  Yes Daniel Bray MD   amLODIPine (NORVASC) 10 mg tablet TAKE 1 TABLET EVERY DAY 5/2/18  Yes Monse García MD   ALPRAZolam Mitzi Ar) 0.25 mg tablet Take 1 Tab by mouth two (2) times daily as needed for Anxiety. Max Daily Amount: 0.5 mg. 5/1/18  Yes Monse García MD   LINZESS 145 mcg cap capsule TAKE 1 CAPSULE BY MOUTH DAILY (BEFORE BREAKFAST). 12/15/17  Yes Tacho Ortez NP   traZODone (DESYREL) 50 mg tablet TAKE 2 TABLETS EVERY NIGHT 12/14/17  Yes Monse García MD   levothyroxine (SYNTHROID) 88 mcg tablet TAKE 1 TABLET DAILY MOST DAYS AND 1 & 1/2 TABLETS DAILY ON SUNDAYS (7.5 TABLETS TOTAL/WEEK = 94 MCG/DAY AVERAGE DOSE) 12/13/17  Yes Junior Durand MD   ipratropium (ATROVENT) 0.03 % nasal spray 2 Sprays by Both Nostrils route three (3) times daily as needed for Rhinitis.  11/21/17  Yes Monse García MD   lisinopril (PRINIVIL, ZESTRIL) 20 mg tablet TAKE 1 TABLET EVERY DAY 10/25/17  Yes Monse García MD   fenofibrate nanocrystallized (TRICOR) 145 mg tablet TAKE 1 TABLET DAILY FOR HYPERLIPIDEMIA 10/20/17  Yes Monse García MD   esomeprazole (NEXIUM) 20 mg capsule TAKE 1 CAPSULE EVERY DAY AS NEEDED 10/13/17  Yes Enrrique Goodman MD   azelastine (ASTELIN) 137 mcg (0.1 %) nasal spray 1 [place] by Both Nostrils route two (2) times a day. Use in each nostril as directed 9/7/17  Yes Enrrique Goodman MD   ibandronate (BONIVA) 150 mg tablet TAKE 1 TABLET (150 MG) EVERY THIRTY (30) DAYS 9/6/17  Yes Enrrique Goodman MD   cycloSPORINE (RESTASIS) 0.05 % ophthalmic emulsion Administer 1 Drop to both eyes two (2) times a day. Yes Historical Provider   multivitamin (ONE A DAY) tablet Take 1 Tab by mouth daily. Yes Historical Provider   multivit-min-folic acid-biotin 83.9-1,698 mcg tab Take  by mouth. Yes Historical Provider   CALCIUM CARBONATE/VITAMIN D3 (CALCIUM + D PO) Take  by mouth. Yes Historical Provider   aspirin delayed-release 81 mg tablet Take 1 Tab by mouth daily. 12/17/15  Yes Jeremiah Munoz MD   buPROPion [facility]) 75 mg tablet Take 1 Tab by mouth two (2) times a day. 5/10/18   Enrrique Goodman MD          ROS  Complete ROS reviewed and negative or stable except as noted in HPI. Physical Exam   Constitutional: She is oriented to person, place, and time. She appears well-nourished. No distress. HENT:   Head: Normocephalic and atraumatic. Right Ear: Tympanic membrane normal.   Nose: Sinus tenderness present. Eyes: EOM are normal. Pupils are equal, round, and reactive to light. No scleral icterus. Neck: Normal range of motion. Neck supple. No JVD present. Cardiovascular: Normal rate, regular rhythm and normal heart sounds. Pulmonary/Chest: Effort normal and breath sounds normal. No respiratory distress. She has no wheezes. She has no rales. Abdominal: Soft. She exhibits no distension. There is no tenderness. Musculoskeletal: Normal range of motion. She exhibits no edema. Lymphadenopathy:     She has no cervical adenopathy. Neurological: She is alert and oriented to person, place, and time. She exhibits normal muscle tone. Skin: Skin is warm. No rash noted. Psychiatric: She has a normal mood and affect. Nursing note and vitals reviewed. Prior labs reviewed. Reviewed allergy notes      Assessment/Plan:    ICD-10-CM ICD-9-CM    1. Chronic vasomotor rhinitis J30.0 477.9 fluticasone (FLONASE) 50 mcg/actuation nasal spray      DISCONTINUED: fluticasone (FLONASE) 50 mcg/actuation nasal spray   2. Acute recurrent maxillary sinusitis J01.01 461.0 azithromycin (ZITHROMAX) 250 mg tablet   3. Recurrent depression (HCC) F33.9 296.30    4. Essential hypertension I10 401.9      Follow-up Disposition:  Return in about 2 months (around 9/20/2018), or if symptoms worsen or fail to improve, for Medicare Wellness Visit, cholesterol, blood pressure.    results and schedule of future studies reviewed with patient  reviewed diet, exercise and weight   reviewed medications and side effects in detail  Resume flonase - chronic maintenance  atrovent and astelin nasal sprays prn  azithro if worsening  Continue other current medications

## 2018-07-20 NOTE — PROGRESS NOTES
Exam room 13  Hallie Brown is a 79 y.o. female  Chief Complaint   Patient presents with    Sinus Infection     Patient just returned from seeing her daughter in [place]     1. Have you been to the ER, urgent care clinic since your last visit? Hospitalized since your last visit? No    2. Have you seen or consulted any other health care providers outside of the [address] [place] [address] since your last visit? Include any pap smears or colon screening.  No

## 2018-07-25 ENCOUNTER — TELEPHONE (OUTPATIENT)
Dept: INTERNAL MEDICINE CLINIC | Age: 68
End: 2018-07-25

## 2018-07-25 ENCOUNTER — TELEPHONE (OUTPATIENT)
Dept: DIABETES SERVICES | Age: 68
End: 2018-07-25

## 2018-07-25 NOTE — TELEPHONE ENCOUNTER
Called patient's cell number and LVM requesting a return call to potentially schedule MNT for the following:   I10 essential hypertension and E78.5 hyperlipidemia, unspecified . Need to advise the patient that I spoke tello/Cristina today at [address] [place] [address]) and these are considered non-covered diagnosis. The patient may be seen by our center and once the claim is denied as non-covered [facility] will make her self pay and offer her a discount. I also spoke tello/Brittany in Dr. Carmen Barajas office and verified that this patient does NOT have any diagnosis of diabetes. There is a future appointment scheudled for Dr. Meño Meade -- in the appointment note it states \"f/u diabetes\". This is incorrect. I have attempted to have the note removed/changed but am unable to locate where the appointment originated.

## 2018-07-25 NOTE — TELEPHONE ENCOUNTER
Spoke with Rafat Slater from [facility]. Rafat Slater stated that she spoke to [facility] and they stated since the patient doesn't have Diabetes the patient will have to pay for the initial consult out of pocket. Rafat Slater stated she will discuss this with the patient and fax over the response of the patient .

## 2018-07-25 NOTE — TELEPHONE ENCOUNTER
Please clarify whether this is specific to [facility] since the diabetes treatment center has provided us with information that they are willing and able to provide diet education to pt's without diabetes    Either way, notify pt of insurance decision.

## 2018-07-26 DIAGNOSIS — M81.0 OSTEOPOROSIS, UNSPECIFIED OSTEOPOROSIS TYPE, UNSPECIFIED PATHOLOGICAL FRACTURE PRESENCE: ICD-10-CM

## 2018-07-26 RX ORDER — LISINOPRIL 20 MG/1
TABLET ORAL
Qty: 90 TAB | Refills: 3 | Status: SHIPPED | OUTPATIENT
Start: 2018-07-26 | End: 2019-05-13 | Stop reason: SDUPTHER

## 2018-07-26 RX ORDER — IBANDRONATE SODIUM 150 MG/1
TABLET, FILM COATED ORAL
Qty: 3 TAB | Refills: 3 | Status: SHIPPED | OUTPATIENT
Start: 2018-07-26 | End: 2019-05-13 | Stop reason: SDUPTHER

## 2018-07-26 NOTE — TELEPHONE ENCOUNTER
Spoke to patient. Patient was informed what Ceci told Vega Bruce in regarding to not covering her nutrition appointment and the patient having to pay out of pocket. Patient stated she will not be going to Nutrition class because she isn't going to pay for them. Patient given an opportunity to ask questions, repeated information, and verbalized understanding.

## 2018-08-01 ENCOUNTER — TELEPHONE (OUTPATIENT)
Dept: DIABETES SERVICES | Age: 68
End: 2018-08-01

## 2018-08-23 ENCOUNTER — OFFICE VISIT (OUTPATIENT)
Dept: ENDOCRINOLOGY | Age: 68
End: 2018-08-23

## 2018-08-23 VITALS
DIASTOLIC BLOOD PRESSURE: 57 MMHG | BODY MASS INDEX: 26.46 KG/M2 | HEIGHT: 60 IN | HEART RATE: 64 BPM | WEIGHT: 134.8 LBS | OXYGEN SATURATION: 97 % | SYSTOLIC BLOOD PRESSURE: 125 MMHG

## 2018-08-23 DIAGNOSIS — E03.4 HYPOTHYROIDISM DUE TO ACQUIRED ATROPHY OF THYROID: Primary | ICD-10-CM

## 2018-08-23 DIAGNOSIS — L65.8 FEMALE PATTERN HAIR LOSS: ICD-10-CM

## 2018-08-23 NOTE — PROGRESS NOTES
Chief Complaint   Patient presents with    Thyroid Problem       1. Have you been to the ER, urgent care clinic since your last visit? Hospitalized since your last visit? No    2. Have you seen or consulted any other health care providers outside of the [address] [place] [address] since your last visit? Include any pap smears or colon screening.  No

## 2018-08-23 NOTE — PATIENT INSTRUCTIONS
Hypothyroidism following radioactive iodine:    Continue 88 mcg - this dose has been stable. Hair loss:  Continue minoxidil.  May take 6-12 months to have full effect

## 2018-08-23 NOTE — MR AVS SNAPSHOT
[address] [facility] 
[phone] Patient: Claudean Lion MRN: [id number] AIT:[id number] Visit Information Date & Time Provider Department Dept. Phone Encounter #  
 8/23/2018 11:30 AM Markus Burris, [address] [facility] [phone] Follow-up Instructions Return in about 6 months (around 2/23/2019). Your Appointments 9/18/2018  8:00 AM  
LAB with LAB CPIM [facility] ([facility]) Appt Note: per pt labs prior to appt 9/25/18  
 [address] [facility] [address] [facility] [address]  
[address] [facility] [address]  
  
    
 9/25/2018  9:00 AM  
Medicare Physical with Rahul Kumar MD  
[facility]) Appt Note: mwv, cholesterol, bp  
 [address] [facility] [address] [facility] [address]  
[place] [address] [facility] [address] Upcoming Health Maintenance Date Due Influenza Age 5 to Adult 8/1/2018 MEDICARE YEARLY EXAM 9/8/2018 GLAUCOMA SCREENING Q2Y 7/1/2019 BREAST CANCER SCRN MAMMOGRAM 5/1/2020 COLONOSCOPY 5/5/2021 DTaP/Tdap/Td series (2 - Td) 12/18/2025 Allergies as of 8/23/2018  Review Complete On: 7/20/2018 By: Rahul Kumar MD  
  
 Severity Noted Reaction Type Reactions Mold  12/17/2015    Sneezing Wellbutrin [Bupropion Hcl]  04/30/2018    Nausea Only And dizziness Current Immunizations  Reviewed on 12/13/2017 Name Date Influenza Vaccine 10/12/2017, 1/11/2017 Pneumococcal Conjugate (PCV-13) 12/18/2015 Pneumococcal Polysaccharide (PPSV-23) 9/7/2017 Tdap 12/18/2015 Zoster Vaccine, Live 2/16/2017 Not reviewed this visit You Were Diagnosed With   
  
 Codes Comments Hypothyroidism due to acquired atrophy of thyroid    -  Primary ICD-10-CM: E03.4 ICD-9-CM: 244.8, 246.8 Female pattern hair loss     ICD-10-CM: L65.8 ICD-9-CM: 704.09 Vitals BP Pulse Height(growth percentile) Weight(growth percentile) SpO2 BMI  
 125/57 (BP 1 Location: Right arm, BP Patient Position: Sitting) 64 5' (1.524 m) 134 lb 12.8 oz (61.1 kg) 97% 26.33 kg/m2 OB Status Smoking Status Postmenopausal Never Smoker BMI and BSA Data Body Mass Index Body Surface Area  
 26.33 kg/m 2 1.61 m 2 Preferred Pharmacy Pharmacy Name Phone Annam[facility]  [address] [phone] Your Updated Medication List  
  
   
This list is accurate as of 8/23/18 12:15 PM.  Always use your most recent med list. amLODIPine 10 mg tablet Commonly known as:  Sancho Samson TAKE 1 TABLET EVERY DAY  
  
 aspirin delayed-release 81 mg tablet Take 1 Tab by mouth daily. CALCIUM + D PO Take 1 Cap by mouth daily. cycloSPORINE 0.05 % ophthalmic emulsion Commonly known as:  RESTASIS Administer 1 Drop to both eyes two (2) times a day. esomeprazole 20 mg capsule Commonly known as:  NEXIUM  
TAKE 1 CAPSULE EVERY DAY AS NEEDED  
  
 fenofibrate nanocrystallized 145 mg tablet Commonly known as:  TRICOR  
TAKE 1 TABLET DAILY FOR HYPERLIPIDEMIA  
  
 fluticasone 50 mcg/actuation nasal spray Commonly known as:  Estela Spencer 2 Sprays by Both Nostrils route daily. HAIR,SKIN AND NAILS PO Take 2 Caps by mouth daily. ibandronate 150 mg tablet Commonly known as:  Paula Meo TAKE 1 TABLET (150 MG) EVERY THIRTY (30) DAYS  
  
 levothyroxine 88 mcg tablet Commonly known as:  SYNTHROID  
TAKE 1 TABLET DAILY MOST DAYS AND 1 & 1/2 TABLETS DAILY ON SUNDAYS (7.5 TABLETS TOTAL/WEEK = 94 MCG/DAY AVERAGE DOSE) LINZESS 145 mcg Cap capsule Generic drug:  linaclotide TAKE 1 CAPSULE BY MOUTH DAILY (BEFORE BREAKFAST). lisinopril 20 mg tablet Commonly known as:  PRINIVIL, ZESTRIL  
TAKE 1 TABLET EVERY DAY  
  
 multivitamin tablet Commonly known as:  ONE A DAY Take 1 Tab by mouth daily. rosuvastatin 20 mg tablet Commonly known as:  CRESTOR  
TAKE 1 TABLET EVERY NIGHT  
  
 sertraline 100 mg tablet Commonly known as:  ZOLOFT Take 2 Tabs by mouth daily. traZODone 50 mg tablet Commonly known as:  DESYREL  
TAKE 2 TABLETS EVERY NIGHT  
  
 VITAMIN C PO Take 1 Cap by mouth daily. We Performed the Following T4, FREE [id number] CPT(R)] TSH 3RD GENERATION [06311 CPT(R)] Follow-up Instructions Return in about 6 months (around 2/23/2019). Patient Instructions Hypothyroidism following radioactive iodine: 
 
Continue 88 mcg - this dose has been stable. Hair loss: 
Continue minoxidil. May take 6-12 months to have full effect Introducing [facility]! Dear Jose Francisco Combs: Thank you for requesting a [employer] account. Our records indicate that you already have an active [employer] account. You can access your account anytime at [email or web address] Did you know that you can access your hospital and ER discharge instructions at any time in [employer]? You can also review all of your test results from your hospital stay or ER visit. Additional Information If you have questions, please visit the Frequently Asked Questions section of the [employer] website at [email or web address] Remember, [employer] is NOT to be used for urgent needs. For medical emergencies, dial 911. Now available from your iPhone and Android! Please provide this summary of care documentation to your next provider. Your primary care clinician is listed as [address] If you have any questions after today's visit, please call [phone].

## 2018-08-23 NOTE — PROGRESS NOTES
History of Present Illness: Anna Webb is a 79 y.o. female presents for follow-up of hypothyroidism  She had two treatments with METCALF in late 1990s for Graves disease. TSH was low in 2016 and dose was decreased to 88 mcg. She has remained on this dose since. Reviewed thyroid hormone levels have been stable earlier this year, both in February and in April. Chronic dry eyes remain a problem for her. Following with eye doctor. Weight is down a little - down 5 lbs from 6 months ago. This is intentional.    She has concerns about frontal hair loss. She has been taking and using minoxidil for the past 2 months or so. She thinks this may be helping to some degree  Dermatologist recommended a brush which provides laser therapy and promotes hair growth. Social:  Thinking of taking part time job. She was able to visit her grandchildren in [place], which was enjoyable for her much overdue. Past Medical History:   Diagnosis Date    Arthritis     Carpal tunnel syndrome, bilateral     Depression     H/O colonoscopy 2011    no polyps, + diverticulosis and nonbleeding internal hemorroids    Hypercholesterolemia     Hypertension     Lyme disease 2007    Menopause 1999    Osteoporosis 11/17/2016    Thyroid disease     Trochanteric bursitis      Current Outpatient Prescriptions   Medication Sig    ascorbate calcium (VITAMIN C PO) Take 1 Cap by mouth daily.  ibandronate (BONIVA) 150 mg tablet TAKE 1 TABLET (150 MG) EVERY THIRTY (30) DAYS    lisinopril (PRINIVIL, ZESTRIL) 20 mg tablet TAKE 1 TABLET EVERY DAY    multivitamin with minerals (HAIR,SKIN AND NAILS PO) Take 2 Caps by mouth daily.  fluticasone (FLONASE) 50 mcg/actuation nasal spray 2 Sprays by Both Nostrils route daily.  sertraline (ZOLOFT) 100 mg tablet Take 2 Tabs by mouth daily.     rosuvastatin (CRESTOR) 20 mg tablet TAKE 1 TABLET EVERY NIGHT    amLODIPine (NORVASC) 10 mg tablet TAKE 1 TABLET EVERY DAY    LINZESS 145 mcg cap capsule TAKE 1 CAPSULE BY MOUTH DAILY (BEFORE BREAKFAST). (Patient taking differently: TAKE 1 CAPSULE BY MOUTH DAILY (BEFORE BREAKFAST). As needed)    traZODone (DESYREL) 50 mg tablet TAKE 2 TABLETS EVERY NIGHT (Patient taking differently: TAKE 2 TABLETS EVERY NIGHT as needed)    levothyroxine (SYNTHROID) 88 mcg tablet TAKE 1 TABLET DAILY MOST DAYS AND 1 & 1/2 TABLETS DAILY ON SUNDAYS (7.5 TABLETS TOTAL/WEEK = 94 MCG/DAY AVERAGE DOSE)    fenofibrate nanocrystallized (TRICOR) 145 mg tablet TAKE 1 TABLET DAILY FOR HYPERLIPIDEMIA    esomeprazole (NEXIUM) 20 mg capsule TAKE 1 CAPSULE EVERY DAY AS NEEDED    cycloSPORINE (RESTASIS) 0.05 % ophthalmic emulsion Administer 1 Drop to both eyes two (2) times a day.  multivitamin (ONE A DAY) tablet Take 1 Tab by mouth daily.  CALCIUM CARBONATE/VITAMIN D3 (CALCIUM + D PO) Take 1 Cap by mouth daily.  aspirin delayed-release 81 mg tablet Take 1 Tab by mouth daily.  ALPRAZolam (XANAX) 0.25 mg tablet Take 1 Tab by mouth two (2) times daily as needed for Anxiety. Max Daily Amount: 0.5 mg.    ipratropium (ATROVENT) 0.03 % nasal spray 2 Sprays by Both Nostrils route three (3) times daily as needed for Rhinitis.  azelastine (ASTELIN) 137 mcg (0.1 %) nasal spray 1 [place] by Both Nostrils route two (2) times a day. Use in each nostril as directed    multivit-min-folic acid-biotin 02.4-5,256 mcg tab Take  by mouth. No current facility-administered medications for this visit.       Allergies   Allergen Reactions    Mold Sneezing    Wellbutrin [Bupropion Hcl] Nausea Only     And dizziness     Physical Examination:  Visit Vitals    /57 (BP 1 Location: Right arm, BP Patient Position: Sitting)    Pulse 64    Ht 5' (1.524 m)    Wt 134 lb 12.8 oz (61.1 kg)    SpO2 97%    BMI 26.33 kg/m2   -   - General: pleasant, no distress, normal gait   HEENT: hearing intact, EOMI, clear sclera without icterus  - Neck: no thyromegaly or LAD  - Cardiovascular: regular, normal rate   - Respiratory: normal effort  - Integumentary: no edema. She has very mild and modest hair thinning on the frontal aspect of her head. - Psychiatric: normal mood and affect    Data Reviewed:   Component      Latest Ref Rng & Units 4/12/2018 4/12/2018 2/22/2018 2/22/2018           8:07 AM  8:07 AM 12:56 PM 12:56 PM   TSH      0.450 - 4.500 uIU/mL 2.260  1.490    T4, Free      0.82 - 1.77 ng/dL  1.49  1.41     Component      Latest Ref Rng & Units 8/28/2017 8/28/2017 2/3/2017 2/3/2017           2:40 PM  2:40 PM  1:05 AM  1:05 AM   TSH      0.450 - 4.500 uIU/mL  1.400     T4, Free      0.82 - 1.77 ng/dL 1.54   1.51       Assessment/Plan:   1. Hypothyroidism due to acquired atrophy of thyroid   Continue levothyroxine 88 mcg. We will plan on reassessing in about another 6 months. 2. Female pattern hair loss   Recommend she continue over-the-counter minoxidil. Discussed proper application technique. Patient Instructions   Hypothyroidism following radioactive iodine:    Continue 88 mcg - this dose has been stable. Hair loss:  Continue minoxidil. May take 6-12 months to have full effect          Follow-up Disposition:  Return in about 6 months (around 2/23/2019).     Copy sent to:

## 2018-09-18 ENCOUNTER — LAB ONLY (OUTPATIENT)
Dept: INTERNAL MEDICINE CLINIC | Age: 68
End: 2018-09-18

## 2018-09-18 DIAGNOSIS — I10 ESSENTIAL HYPERTENSION: ICD-10-CM

## 2018-09-18 DIAGNOSIS — R73.9 HYPERGLYCEMIA: ICD-10-CM

## 2018-09-18 DIAGNOSIS — E03.4 HYPOTHYROIDISM DUE TO ACQUIRED ATROPHY OF THYROID: ICD-10-CM

## 2018-09-18 DIAGNOSIS — E55.9 VITAMIN D DEFICIENCY: ICD-10-CM

## 2018-09-18 DIAGNOSIS — E78.5 HYPERLIPIDEMIA, UNSPECIFIED HYPERLIPIDEMIA TYPE: Primary | ICD-10-CM

## 2018-09-18 DIAGNOSIS — I10 ESSENTIAL HYPERTENSION: Primary | ICD-10-CM

## 2018-09-19 LAB
25(OH)D3+25(OH)D2 SERPL-MCNC: 43.4 NG/ML (ref 30–100)
ALBUMIN SERPL-MCNC: 4.4 G/DL (ref 3.6–4.8)
ALBUMIN/GLOB SERPL: 2.2 {RATIO} (ref 1.2–2.2)
ALP SERPL-CCNC: 28 IU/L (ref 39–117)
ALT SERPL-CCNC: 14 IU/L (ref 0–32)
AST SERPL-CCNC: 23 IU/L (ref 0–40)
BASOPHILS # BLD AUTO: 0 X10E3/UL (ref 0–0.2)
BASOPHILS NFR BLD AUTO: 0 %
BILIRUB SERPL-MCNC: 0.3 MG/DL (ref 0–1.2)
BUN SERPL-MCNC: 26 MG/DL (ref 8–27)
BUN/CREAT SERPL: 25 (ref 12–28)
CALCIUM SERPL-MCNC: 9.9 MG/DL (ref 8.7–10.3)
CHLORIDE SERPL-SCNC: 103 MMOL/L (ref 96–106)
CHOLEST SERPL-MCNC: 141 MG/DL (ref 100–199)
CK SERPL-CCNC: 49 U/L (ref 24–173)
CO2 SERPL-SCNC: 25 MMOL/L (ref 20–29)
CREAT SERPL-MCNC: 1.04 MG/DL (ref 0.57–1)
EOSINOPHIL # BLD AUTO: 0.2 X10E3/UL (ref 0–0.4)
EOSINOPHIL NFR BLD AUTO: 5 %
ERYTHROCYTE [DISTWIDTH] IN BLOOD BY AUTOMATED COUNT: 14 % (ref 12.3–15.4)
EST. AVERAGE GLUCOSE BLD GHB EST-MCNC: 111 MG/DL
GLOBULIN SER CALC-MCNC: 2 G/DL (ref 1.5–4.5)
GLUCOSE SERPL-MCNC: 84 MG/DL (ref 65–99)
HBA1C MFR BLD: 5.5 % (ref 4.8–5.6)
HCT VFR BLD AUTO: 37.6 % (ref 34–46.6)
HDLC SERPL-MCNC: 55 MG/DL
HGB BLD-MCNC: 12.1 G/DL (ref 11.1–15.9)
IMM GRANULOCYTES # BLD: 0 X10E3/UL (ref 0–0.1)
IMM GRANULOCYTES NFR BLD: 0 %
LDLC SERPL CALC-MCNC: 59 MG/DL (ref 0–99)
LYMPHOCYTES # BLD AUTO: 1.4 X10E3/UL (ref 0.7–3.1)
LYMPHOCYTES NFR BLD AUTO: 30 %
MAGNESIUM SERPL-MCNC: 2.3 MG/DL (ref 1.6–2.3)
MCH RBC QN AUTO: 27.9 PG (ref 26.6–33)
MCHC RBC AUTO-ENTMCNC: 32.2 G/DL (ref 31.5–35.7)
MCV RBC AUTO: 87 FL (ref 79–97)
MONOCYTES # BLD AUTO: 0.5 X10E3/UL (ref 0.1–0.9)
MONOCYTES NFR BLD AUTO: 10 %
NEUTROPHILS # BLD AUTO: 2.5 X10E3/UL (ref 1.4–7)
NEUTROPHILS NFR BLD AUTO: 55 %
PLATELET # BLD AUTO: 308 X10E3/UL (ref 150–379)
POTASSIUM SERPL-SCNC: 4.6 MMOL/L (ref 3.5–5.2)
PROT SERPL-MCNC: 6.4 G/DL (ref 6–8.5)
RBC # BLD AUTO: 4.34 X10E6/UL (ref 3.77–5.28)
SODIUM SERPL-SCNC: 144 MMOL/L (ref 134–144)
T4 FREE SERPL-MCNC: 1.26 NG/DL (ref 0.82–1.77)
TRIGL SERPL-MCNC: 137 MG/DL (ref 0–149)
TSH SERPL DL<=0.005 MIU/L-ACNC: 4.72 UIU/ML (ref 0.45–4.5)
VLDLC SERPL CALC-MCNC: 27 MG/DL (ref 5–40)
WBC # BLD AUTO: 4.6 X10E3/UL (ref 3.4–10.8)

## 2018-09-21 NOTE — PROGRESS NOTES
Thyroid dose appears to be a little low. We can discuss your thyroid dosing at your upcoming follow up appt. Other labs are normal and at goal.  Keep up the good work!

## 2018-10-01 ENCOUNTER — TELEPHONE (OUTPATIENT)
Dept: INTERNAL MEDICINE CLINIC | Age: 68
End: 2018-10-01

## 2018-10-01 DIAGNOSIS — M81.0 OSTEOPOROSIS, UNSPECIFIED OSTEOPOROSIS TYPE, UNSPECIFIED PATHOLOGICAL FRACTURE PRESENCE: Primary | ICD-10-CM

## 2018-10-01 NOTE — TELEPHONE ENCOUNTER
Pt is requesting for Bone Density order to be placed so that she can go get it done. Please call and advise when order has been placed.

## 2018-10-09 ENCOUNTER — OFFICE VISIT (OUTPATIENT)
Dept: INTERNAL MEDICINE CLINIC | Age: 68
End: 2018-10-09

## 2018-10-09 VITALS
SYSTOLIC BLOOD PRESSURE: 131 MMHG | WEIGHT: 134.4 LBS | OXYGEN SATURATION: 98 % | DIASTOLIC BLOOD PRESSURE: 77 MMHG | TEMPERATURE: 98.3 F | HEART RATE: 64 BPM | RESPIRATION RATE: 16 BRPM | BODY MASS INDEX: 26.39 KG/M2 | HEIGHT: 60 IN

## 2018-10-09 DIAGNOSIS — F41.9 ANXIETY AND DEPRESSION: ICD-10-CM

## 2018-10-09 DIAGNOSIS — M81.0 OSTEOPOROSIS, UNSPECIFIED OSTEOPOROSIS TYPE, UNSPECIFIED PATHOLOGICAL FRACTURE PRESENCE: ICD-10-CM

## 2018-10-09 DIAGNOSIS — F32.A ANXIETY AND DEPRESSION: ICD-10-CM

## 2018-10-09 DIAGNOSIS — F33.9 RECURRENT DEPRESSION (HCC): ICD-10-CM

## 2018-10-09 DIAGNOSIS — J30.0 CHRONIC VASOMOTOR RHINITIS: ICD-10-CM

## 2018-10-09 DIAGNOSIS — E78.5 HYPERLIPIDEMIA, UNSPECIFIED HYPERLIPIDEMIA TYPE: ICD-10-CM

## 2018-10-09 DIAGNOSIS — Z00.00 MEDICARE ANNUAL WELLNESS VISIT, SUBSEQUENT: Primary | ICD-10-CM

## 2018-10-09 DIAGNOSIS — E03.4 HYPOTHYROIDISM DUE TO ACQUIRED ATROPHY OF THYROID: ICD-10-CM

## 2018-10-09 DIAGNOSIS — H69.83 EUSTACHIAN TUBE DYSFUNCTION, BILATERAL: ICD-10-CM

## 2018-10-09 DIAGNOSIS — J01.01 ACUTE RECURRENT MAXILLARY SINUSITIS: ICD-10-CM

## 2018-10-09 DIAGNOSIS — Z23 ENCOUNTER FOR IMMUNIZATION: ICD-10-CM

## 2018-10-09 DIAGNOSIS — I73.9 PERIPHERAL VASCULAR DISEASE (HCC): ICD-10-CM

## 2018-10-09 DIAGNOSIS — R68.89 ABNORMAL FINDING ON SCREENING PROCEDURE: ICD-10-CM

## 2018-10-09 DIAGNOSIS — I10 ESSENTIAL HYPERTENSION: ICD-10-CM

## 2018-10-09 DIAGNOSIS — J30.2 OTHER SEASONAL ALLERGIC RHINITIS: ICD-10-CM

## 2018-10-09 RX ORDER — IPRATROPIUM BROMIDE 21 UG/1
2 SPRAY, METERED NASAL
Qty: 90 ML | Refills: 3 | Status: SHIPPED | OUTPATIENT
Start: 2018-10-09 | End: 2019-03-21

## 2018-10-09 RX ORDER — BUPROPION HYDROCHLORIDE 75 MG/1
75 TABLET ORAL 2 TIMES DAILY
Qty: 60 TAB | Refills: 0
Start: 2018-10-09 | End: 2019-03-21

## 2018-10-09 RX ORDER — LEVOTHYROXINE SODIUM 100 UG/1
100 TABLET ORAL
Qty: 90 TAB | Refills: 1 | Status: SHIPPED | OUTPATIENT
Start: 2018-10-09 | End: 2018-10-15 | Stop reason: SDUPTHER

## 2018-10-09 RX ORDER — PREDNISONE 10 MG/1
TABLET ORAL
Qty: 21 TAB | Refills: 0 | Status: SHIPPED | OUTPATIENT
Start: 2018-10-09 | End: 2018-12-27 | Stop reason: SDUPTHER

## 2018-10-09 RX ORDER — AZITHROMYCIN 250 MG/1
TABLET, FILM COATED ORAL
Qty: 6 TAB | Refills: 0 | Status: SHIPPED | OUTPATIENT
Start: 2018-10-09 | End: 2018-12-27 | Stop reason: SDUPTHER

## 2018-10-09 NOTE — PATIENT INSTRUCTIONS
Medicare Wellness Visit, Female The best way to live healthy is to have a lifestyle where you eat a well-balanced diet, exercise regularly, limit alcohol use, and quit all forms of tobacco/nicotine, if applicable. Regular preventive services are another way to keep healthy. Preventive services (vaccines, screening tests, monitoring & exams) can help personalize your care plan, which helps you manage your own care. Screening tests can find health problems at the earliest stages, when they are easiest to treat. [facility] follows the current, evidence-based guidelines published by the [facility] ([facility]) when recommending preventive services for our patients. Because we follow these guidelines, sometimes recommendations change over time as research supports it. (For example, mammograms used to be recommended annually. Even though Medicare will still pay for an annual mammogram, the newer guidelines recommend a mammogram every two years for women of average risk.) Of course, you and your doctor may decide to screen more often for some diseases, based on your risk and your health status. Preventive services for you include: - Medicare offers their members a free annual wellness visit, which is time for you and your primary care provider to discuss and plan for your preventive service needs. Take advantage of this benefit every year! 
-All adults over the age of 72 should receive the recommended pneumonia vaccines. Current USPSTF guidelines recommend a series of two vaccines for the best pneumonia protection.  
-All adults should have a flu vaccine yearly and a tetanus vaccine every 10 years. All adults age 61 and older should receive a shingles vaccine once in their lifetime.   
-A bone mass density test is recommended when a woman turns 65 to screen for osteoporosis. This test is only recommended one time, as a screening. Some providers will use this same test as a disease monitoring tool if you already have osteoporosis. -All adults age 38-68 who are overweight should have a diabetes screening test once every three years.  
-Other screening tests and preventive services for persons with diabetes include: an eye exam to screen for diabetic retinopathy, a kidney function test, a foot exam, and stricter control over your cholesterol.  
-Cardiovascular screening for adults with routine risk involves an electrocardiogram (ECG) at intervals determined by your doctor.  
-Colorectal cancer screenings should be done for adults age 54-65 with no increased risk factors for colorectal cancer. There are a number of acceptable methods of screening for this type of cancer. Each test has its own benefits and drawbacks. Discuss with your doctor what is most appropriate for you during your annual wellness visit. The different tests include: colonoscopy (considered the best screening method), a fecal occult blood test, a fecal DNA test, and sigmoidoscopy. -Breast cancer screenings are recommended every other year for women of normal risk, age 54-69. 
-Cervical cancer screenings for women over age 72 are only recommended with certain risk factors.  
-All adults born between [facility] should be screened once for Hepatitis C. Here is a list of your current Health Maintenance items (your personalized list of preventive services) with a due date: 
Health Maintenance Due Topic Date Due  Shingles Vaccine (1 of 2) 11/22/2000  Flu Vaccine  08/01/2018 Russell Wilkins Annual Well Visit  09/08/2018 Ask pharmacy about shingles vaccines - Shingrix

## 2018-10-09 NOTE — PROGRESS NOTES
This is the Subsequent Medicare Annual Wellness Exam, performed 12 months or more after the Initial AWV or the last Subsequent AWV I have reviewed the patient's medical history in detail and updated the computerized patient record. History Past Medical History:  
Diagnosis Date  Arthritis  Carpal tunnel syndrome, bilateral   
 Depression  H/O colonoscopy   
 no polyps, + diverticulosis and nonbleeding internal hemorroids  Hypercholesterolemia  Hypertension  Lyme disease  [address]  Osteoporosis 2016  Thyroid disease  Trochanteric bursitis Past Surgical History:  
Procedure Laterality Date  HX CHOLECYSTECTOMY [place] [facility]    
  Current Outpatient Prescriptions Medication Sig Dispense Refill  ipratropium (ATROVENT) 0.03 % nasal spray 2 Sprays by Both Nostrils route three (3) times daily as needed for Rhinitis. 90 mL 3  
 azithromycin (ZITHROMAX) 250 mg tablet Take 2 tablets today, then take 1 tablet daily 6 Tab 0  predniSONE (DELTASONE) 10 mg tablet Taper daily. 60mg x1, 50mg x 1, 40mg x 1, 30mg x 1, 20mg x 1, 10mg x 1 21 Tab 0  
 ascorbate calcium (VITAMIN C PO) Take 1 Cap by mouth daily.  ibandronate (BONIVA) 150 mg tablet TAKE 1 TABLET (150 MG) EVERY THIRTY (30) DAYS 3 Tab 3  
 lisinopril (PRINIVIL, ZESTRIL) 20 mg tablet TAKE 1 TABLET EVERY DAY 90 Tab 3  
 multivitamin with minerals (HAIR,SKIN AND NAILS PO) Take 2 Caps by mouth daily.  fluticasone (FLONASE) 50 mcg/actuation nasal spray 2 Sprays by Both Nostrils route daily. 3 Bottle 3  
 sertraline (ZOLOFT) 100 mg tablet Take 2 Tabs by mouth daily. 180 Tab 1  
 rosuvastatin (CRESTOR) 20 mg tablet TAKE 1 TABLET EVERY NIGHT 90 Tab 3  
 amLODIPine (NORVASC) 10 mg tablet TAKE 1 TABLET EVERY DAY 90 Tab 1  
 LINZESS 145 mcg cap capsule TAKE 1 CAPSULE BY MOUTH DAILY (BEFORE BREAKFAST).  (Patient taking differently: TAKE 1 CAPSULE BY MOUTH DAILY (BEFORE BREAKFAST). As needed) 90 Cap 3  
 traZODone (DESYREL) 50 mg tablet TAKE 2 TABLETS EVERY NIGHT (Patient taking differently: TAKE 2 TABLETS EVERY NIGHT as needed) 180 Tab 3  
 levothyroxine (SYNTHROID) 88 mcg tablet TAKE 1 TABLET DAILY MOST DAYS AND 1 & 1/2 TABLETS DAILY ON SUNDAYS (7.5 TABLETS TOTAL/WEEK = 94 MCG/DAY AVERAGE DOSE) 98 Tab 3  
 fenofibrate nanocrystallized (TRICOR) 145 mg tablet TAKE 1 TABLET DAILY FOR HYPERLIPIDEMIA 90 Tab 1  
 esomeprazole (NEXIUM) 20 mg capsule TAKE 1 CAPSULE EVERY DAY AS NEEDED 90 Cap 4  cycloSPORINE (RESTASIS) 0.05 % ophthalmic emulsion Administer 1 Drop to both eyes two (2) times a day.  multivitamin (ONE A DAY) tablet Take 1 Tab by mouth daily.  CALCIUM CARBONATE/VITAMIN D3 (CALCIUM + D PO) Take 1 Cap by mouth daily.  aspirin delayed-release 81 mg tablet Take 1 Tab by mouth daily. 90 Tab 3 Allergies Allergen Reactions  Mold Sneezing  Wellbutrin [Bupropion Hcl] Nausea Only And dizziness Family History Problem Relation Age of Onset  Breast Cancer Niece 47 Social History Substance Use Topics  Smoking status: Never Smoker  Smokeless tobacco: Never Used  Alcohol use Yes Comment: occasionally Patient Active Problem List  
Diagnosis Code  Essential hypertension I10  
 HLD (hyperlipidemia) E78.5  Hypothyroidism due to acquired atrophy of thyroid E03.4  Anxiety and depression F41.9, F32.9  Chronic constipation K59.09  
 Other seasonal allergic rhinitis J30.2  Osteoporosis M81.0  Carpal tunnel syndrome, bilateral G56.03  
 Trochanteric bursitis M70.60  Chronic vasomotor rhinitis J30.0  Recurrent depression ([facility] [address] F33.9 Depression Risk Factor Screening: PHQ over the last two weeks 10/9/2018 Little interest or pleasure in doing things Several days Feeling down, depressed, irritable, or hopeless Several days Total Score PHQ 2 2 Alcohol Risk Factor Screening: You do not drink alcohol or very rarely. Functional Ability and Level of Safety:  
Hearing Loss Hearing is good. Activities of Daily Living The home contains: no safety equipment. Patient does total self care Fall Risk Fall Risk Assessment, last 12 mths 10/9/2018 Able to walk? Yes Fall in past 12 months? No  
 
 
Abuse Screen Patient is not abused Cognitive Screening Evaluation of Cognitive Function: 
Has your family/caregiver stated any concerns about your memory: no 
 
 
Patient Care Team  
Patient Care Team: 
Geno Ambrocio MD as PCP - General (Internal Medicine) Johana Suggs MD (Gastroenterology) Monika Magana MD (Rheumatology) Em Romo MD as Consulting Provider (Endocrinology) Assessment/Plan Education and counseling provided: 
Are appropriate based on today's review and evaluation ICD-10-CM ICD-9-CM 1. Medicare annual wellness visit, subsequent Z00.00 V70.0 2. Encounter for immunization Z23 V03.89 ADMIN INFLUENZA VIRUS VAC INFLUENZA VIRUS VAC QUAD,SPLIT,PRESV FREE SYRINGE IM  
3. Chronic vasomotor rhinitis J30.0 477.9 ipratropium (ATROVENT) 0.03 % nasal spray 4. Anxiety and depression F41.9 300.00 buPROPion (WELLBUTRIN) 75 mg tablet F32.9 311   
5. Hypothyroidism due to acquired atrophy of thyroid E03.4 244.8 DISCONTINUED: levothyroxine (SYNTHROID) 100 mcg tablet 246.8 6. Hyperlipidemia, unspecified hyperlipidemia type E78.5 272.4 7. Essential hypertension I10 401.9 8. Acute recurrent maxillary sinusitis J01.01 461.0 azithromycin (ZITHROMAX) 250 mg tablet 9. Eustachian tube dysfunction, bilateral H69.83 381.81 predniSONE (DELTASONE) 10 mg tablet 10. Other seasonal allergic rhinitis J30.2 477.8 11. Osteoporosis, unspecified osteoporosis type, unspecified pathological fracture presence M81.0 733.00   
12. Abnormal finding on screening procedure R68.89 796.4 ANKLE BRACHIAL INDEX 13. Peripheral vascular disease (HCC)  I73.9 443. [address] [place] [address] 14. Recurrent depression ([facility]) F33.9 296.30 buPROPion (WELLBUTRIN) 75 mg tablet Follow-up Disposition: 
Return in about 2 months (around 12/9/2018), or if symptoms worsen or fail to improve, for thyroid. results and schedule of future studies reviewed with patient 
reviewed diet, exercise and weight 
cardiovascular risk and specific lipid/LDL goals reviewed 
reviewed medications and side effects in detail Encouraged shingrix DEXA - may reschedule

## 2018-10-09 NOTE — MR AVS SNAPSHOT
[address] [place] [address] [address] [place] [address] 
[phone] Patient: Dominic Cardenas MRN: [id number] Visit Information Date & Time Provider Department Dept. Phone Encounter #  
 10/9/2018  8:00 AM Micah Holstein, [address] [place] [address] and Internal Medicine [phone] [id number] Follow-up Instructions Return in about 2 months (around 12/9/2018), or if symptoms worsen or fail to improve, for thyroid. Upcoming Health Maintenance Date Due Shingrix Vaccine Age 50> (1 of 2) 11/22/2000 Influenza Age 5 to Adult 8/1/2018 MEDICARE YEARLY EXAM 9/8/2018 GLAUCOMA SCREENING Q2Y 7/1/2019 BREAST CANCER SCRN MAMMOGRAM 5/1/2020 COLONOSCOPY 5/5/2021 DTaP/Tdap/Td series (2 - Td) 12/18/2025 Allergies as of 10/9/2018  Review Complete On: 10/9/2018 By: Micah Holstein, MD  
  
 Severity Noted Reaction Type Reactions Mold  12/17/2015    Sneezing Wellbutrin [Bupropion Hcl]  04/30/2018    Nausea Only And dizziness Current Immunizations  Reviewed on 10/9/2018 Name Date Influenza Vaccine 10/12/2017, 1/11/2017 Influenza Vaccine (Quad) PF  Incomplete Pneumococcal Conjugate (PCV-13) 12/18/2015 Pneumococcal Polysaccharide (PPSV-23) 9/7/2017 Tdap 12/18/2015 Zoster Vaccine, Live 2/16/2017 Reviewed by Micah Holstein, MD on 10/9/2018 at  8:29 AM  
 Reviewed by Micah Holstein, MD on 10/9/2018 at  9:05 AM  
You Were Diagnosed With   
  
 Codes Comments Anxiety and depression    -  Primary ICD-10-CM: F41.9, F32.9 ICD-9-CM: 300.00, 311 Encounter for immunization     ICD-10-CM: X25 ICD-9-CM: V03.89 Chronic vasomotor rhinitis     ICD-10-CM: J30.0 ICD-9-CM: 477.9 Hypothyroidism due to acquired atrophy of thyroid     ICD-10-CM: E03.4 ICD-9-CM: 244.8, 246.8 Hyperlipidemia, unspecified hyperlipidemia type     ICD-10-CM: E78.5 ICD-9-CM: 272.4 Essential hypertension     ICD-10-CM: I10 
ICD-9-CM: 401.9 Acute recurrent maxillary sinusitis     ICD-10-CM: J01.01 
ICD-9-CM: 461.0 Eustachian tube dysfunction, bilateral     ICD-10-CM: C37.37 ICD-9-CM: 381.81 Other seasonal allergic rhinitis     ICD-10-CM: J30.2 ICD-9-CM: 477.8 Osteoporosis, unspecified osteoporosis type, unspecified pathological fracture presence     ICD-10-CM: M81.0 ICD-9-CM: 733.00 Medicare annual wellness visit, subsequent     ICD-10-CM: Z00.00 ICD-9-CM: V70.0 Abnormal finding on screening procedure     ICD-10-CM: R68.89 ICD-9-CM: 796.4 Peripheral vascular disease ([facility]     ICD-10-CM: I73.9 ICD-9-CM: 443.9 Recurrent depression ([facility] 75.)     ICD-10-CM: F33.9 ICD-9-CM: 296.30 Vitals BP Pulse Temp Resp Height(growth percentile) Weight(growth percentile) 131/77 (BP 1 Location: Left arm, BP Patient Position: Sitting) 64 98.3 °F (36.8 °C) (Oral) 16 5' (1.524 m) 134 lb 6.4 oz (61 kg) SpO2 BMI OB Status Smoking Status 98% 26.25 kg/m2 Postmenopausal Never Smoker BMI and BSA Data Body Mass Index Body Surface Area  
 26.25 kg/m 2 1.61 m 2 Preferred Pharmacy [facility] Name Phone [facility] [phone] Your Updated Medication List  
  
   
This list is accurate as of 10/9/18  9:17 AM.  Always use your most recent med list. amLODIPine 10 mg tablet Commonly known as:  Luke Cabot TAKE 1 TABLET EVERY DAY  
  
 aspirin delayed-release 81 mg tablet Take 1 Tab by mouth daily. azithromycin 250 mg tablet Commonly known as:  Geeta Patel Take 2 tablets today, then take 1 tablet daily buPROPion 75 mg tablet Commonly known as:  [facility] Take 1 Tab by mouth two (2) times a day. CALCIUM + D PO Take 1 Cap by mouth daily. cycloSPORINE 0.05 % ophthalmic emulsion Commonly known as:  RESTASIS  
 Administer 1 Drop to both eyes two (2) times a day. esomeprazole 20 mg capsule Commonly known as:  NEXIUM  
TAKE 1 CAPSULE EVERY DAY AS NEEDED  
  
 fenofibrate nanocrystallized 145 mg tablet Commonly known as:  TRICOR  
TAKE 1 TABLET DAILY FOR HYPERLIPIDEMIA  
  
 fluticasone 50 mcg/actuation nasal spray Commonly known as:  Huntley Ly 2 Sprays by Both Nostrils route daily. HAIR,SKIN AND NAILS PO Take 2 Caps by mouth daily. ibandronate 150 mg tablet Commonly known as:  Huan Sella TAKE 1 TABLET (150 MG) EVERY THIRTY (30) DAYS  
  
 ipratropium 0.03 % nasal spray Commonly known as:  ATROVENT  
2 Sprays by Both Nostrils route three (3) times daily as needed for Rhinitis. levothyroxine 100 mcg tablet Commonly known as:  SYNTHROID Take 1 Tab by mouth Daily (before breakfast). LINZESS 145 mcg Cap capsule Generic drug:  linaclotide TAKE 1 CAPSULE BY MOUTH DAILY (BEFORE BREAKFAST). lisinopril 20 mg tablet Commonly known as:  PRINIVIL, ZESTRIL  
TAKE 1 TABLET EVERY DAY  
  
 multivitamin tablet Commonly known as:  ONE A DAY Take 1 Tab by mouth daily. predniSONE 10 mg tablet Commonly known as:  Luevenia Mane Taper daily. 60mg x1, 50mg x 1, 40mg x 1, 30mg x 1, 20mg x 1, 10mg x 1  
  
 rosuvastatin 20 mg tablet Commonly known as:  CRESTOR  
TAKE 1 TABLET EVERY NIGHT  
  
 sertraline 100 mg tablet Commonly known as:  ZOLOFT Take 2 Tabs by mouth daily. traZODone 50 mg tablet Commonly known as:  DESYREL  
TAKE 2 TABLETS EVERY NIGHT  
  
 VITAMIN C PO Take 1 Cap by mouth daily. Prescriptions Sent to Pharmacy Refills  
 ipratropium (ATROVENT) 0.03 % nasal spray 3 Si Sprays by Both Nostrils route three (3) times daily as needed for Rhinitis. Class: Normal  
 Pharmacy: [address] [place] [address], [address] [place] [address] Street Ph #: [phone] Route:  Both Nostrils  
 azithromycin (ZITHROMAX) 250 mg tablet 0  
 Sig: Take 2 tablets today, then take 1 tablet daily Class: Normal  
 Pharmacy: [place] [address] Ph #: [phone]  
 predniSONE (DELTASONE) 10 mg tablet 0 Sig: Taper daily. 60mg x1, 50mg x 1, 40mg x 1, 30mg x 1, 20mg x 1, 10mg x 1 Class: Normal  
 Pharmacy: [place] [address] Ph #: [phone]  
 levothyroxine (SYNTHROID) 100 mcg tablet 1 Sig: Take 1 Tab by mouth Daily (before breakfast). Class: Normal  
 Pharmacy: [address] [place] [address], [address] Ph #: [phone] Route: Oral  
  
We Performed the Following ADMIN INFLUENZA VIRUS VAC [ [facility]] INFLUENZA VIRUS VAC QUAD,SPLIT,PRESV FREE SYRINGE IM [id number] CPT(R)] Follow-up Instructions Return in about 2 months (around 12/9/2018), or if symptoms worsen or fail to improve, for thyroid. To-Do List   
 10/09/2018 11:30 AM  
  Appointment with BSI DEXA 1 at [facility] at [facility] ([phone]) Please, no calcium supplements or antacids that coat the stomach (ex: Tums, Mylanta) 24 hours prior to procedure. Maintain normal diet and medications. Dairy products are allowed. Wear an outfit with an elastic waistband (no zipper or metal snaps). Check in at registration 15min before your appointment time unless you were instructed to do otherwise. 10/16/2018 Imaging:  ANKLE BRACHIAL INDEX Patient Instructions Medicare Wellness Visit, Female The best way to live healthy is to have a lifestyle where you eat a well-balanced diet, exercise regularly, limit alcohol use, and quit all forms of tobacco/nicotine, if applicable. Regular preventive services are another way to keep healthy. Preventive services (vaccines, screening tests, monitoring & exams) can help personalize your care plan, which helps you manage your own care. Screening tests can find health problems at the earliest stages, when they are easiest to treat. [facility] follows the current, evidence-based guidelines published by the [facility] and [facility] (USPSTF) when recommending preventive services for our patients. Because we follow these guidelines, sometimes recommendations change over time as research supports it. (For example, mammograms used to be recommended annually. Even though Medicare will still pay for an annual mammogram, the newer guidelines recommend a mammogram every two years for women of average risk.) Of course, you and your doctor may decide to screen more often for some diseases, based on your risk and your health status. Preventive services for you include: - Medicare offers their members a free annual wellness visit, which is time for you and your primary care provider to discuss and plan for your preventive service needs. Take advantage of this benefit every year! 
-All adults over the age of 72 should receive the recommended pneumonia vaccines. Current USPSTF guidelines recommend a series of two vaccines for the best pneumonia protection.  
-All adults should have a flu vaccine yearly and a tetanus vaccine every 10 years. All adults age 61 and older should receive a shingles vaccine once in their lifetime.   
-A bone mass density test is recommended when a woman turns 65 to screen for osteoporosis. This test is only recommended one time, as a screening. Some providers will use this same test as a disease monitoring tool if you already have osteoporosis. -All adults age 38-68 who are overweight should have a diabetes screening test once every three years.  
-Other screening tests and preventive services for persons with diabetes include: an eye exam to screen for diabetic retinopathy, a kidney function test, a foot exam, and stricter control over your cholesterol. -Cardiovascular screening for adults with routine risk involves an electrocardiogram (ECG) at intervals determined by your doctor.  
-Colorectal cancer screenings should be done for adults age 54-65 with no increased risk factors for colorectal cancer. There are a number of acceptable methods of screening for this type of cancer. Each test has its own benefits and drawbacks. Discuss with your doctor what is most appropriate for you during your annual wellness visit. The different tests include: colonoscopy (considered the best screening method), a fecal occult blood test, a fecal DNA test, and sigmoidoscopy. -Breast cancer screenings are recommended every other year for women of normal risk, age 54-69. 
-Cervical cancer screenings for women over age 72 are only recommended with certain risk factors.  
-All adults born between [facility] should be screened once for Hepatitis C. Here is a list of your current Health Maintenance items (your personalized list of preventive services) with a due date: 
Health Maintenance Due Topic Date Due  Shingles Vaccine (1 of 2) 11/22/2000  Flu Vaccine  08/01/2018 Rk Annual Well Visit  09/08/2018 Ask pharmacy about shingles vaccines - Shingrix Introducing [facility]! Dear Winter Nurse: Thank you for requesting a [employer] account. Our records indicate that you already have an active [employer] account. You can access your account anytime at [email or web address] Did you know that you can access your hospital and ER discharge instructions at any time in [employer]? You can also review all of your test results from your hospital stay or ER visit. Additional Information If you have questions, please visit the Frequently Asked Questions section of the [employer] website at [email or web address] Remember, [employer] is NOT to be used for urgent needs. For medical emergencies, dial 911. Now available from your iPhone and Android! Please provide this summary of care documentation to your next provider. Your primary care clinician is listed as [address] If you have any questions after today's visit, please call [phone].

## 2018-10-09 NOTE — PROGRESS NOTES
HPI: 
Presents for f/u HTN, lipids, mood, etc 
 
PAD screening - abnormal 
Pt's hands and feet were cold - ?confounding Pt reports leg cramping. Pt gets sick when she goes to [place] Requests abx to take with her Pt asks re: stem cell tx for dry, red eyes Pt has been feeling tired Taking thyroid daily - by itself and no vitamin supplements with it. Pt reports mood not ideal 
Pt attributes it to psychosocial stressors - elderly, sick sister Also, pt too far away from grandchildren Past medical, Social, and Family history reviewed Prior to Admission medications Medication Sig Start Date End Date Taking? Authorizing Provider  
ascorbate calcium (VITAMIN C PO) Take 1 Cap by mouth daily. Yes Historical Provider  
ibandronate (BONIVA) 150 mg tablet TAKE 1 TABLET (150 MG) EVERY THIRTY (30) DAYS 7/26/18  Yes William Brand MD  
lisinopril (PRINIVIL, ZESTRIL) 20 mg tablet TAKE 1 TABLET EVERY DAY 7/26/18  Yes William Brand MD  
multivitamin with minerals (HAIR,SKIN AND NAILS PO) Take 2 Caps by mouth daily. Yes Historical Provider  
fluticasone (FLONASE) 50 mcg/actuation nasal spray 2 Sprays by Both Nostrils route daily. 7/20/18  Yes William Brand MD  
sertraline (ZOLOFT) 100 mg tablet Take 2 Tabs by mouth daily. 5/10/18  Yes William Brand MD  
rosuvastatin (CRESTOR) 20 mg tablet TAKE 1 TABLET EVERY NIGHT 5/3/18  Yes Rick Patterson MD  
amLODIPine (NORVASC) 10 mg tablet TAKE 1 TABLET EVERY DAY 5/2/18  Yes William Brand MD  
LINZESS 145 mcg cap capsule TAKE 1 CAPSULE BY MOUTH DAILY (BEFORE BREAKFAST). Patient taking differently: TAKE 1 CAPSULE BY MOUTH DAILY (BEFORE BREAKFAST). As needed 12/15/17  Yes Boy Wakefield NP  
traZODone (DESYREL) 50 mg tablet TAKE 2 TABLETS EVERY NIGHT Patient taking differently: TAKE 2 TABLETS EVERY NIGHT as needed 12/14/17  Yes William Brand MD  
levothyroxine (SYNTHROID) 88 mcg tablet TAKE 1 TABLET DAILY MOST DAYS AND 1 & 1/2 TABLETS DAILY ON SUNDAYS (7.5 TABLETS TOTAL/WEEK = 94 MCG/DAY AVERAGE DOSE) 12/13/17  Yes Gustavo Rodriguez MD  
fenofibrate nanocrystallized (TRICOR) 145 mg tablet TAKE 1 TABLET DAILY FOR HYPERLIPIDEMIA 10/20/17  Yes Sole Moreno MD  
esomeprazole (NEXIUM) 20 mg capsule TAKE 1 CAPSULE EVERY DAY AS NEEDED 10/13/17  Yes Sole Moreno MD  
cycloSPORINE (RESTASIS) 0.05 % ophthalmic emulsion Administer 1 Drop to both eyes two (2) times a day. Yes Historical Provider  
multivitamin (ONE A DAY) tablet Take 1 Tab by mouth daily. Yes Historical Provider CALCIUM CARBONATE/VITAMIN D3 (CALCIUM + D PO) Take 1 Cap by mouth daily. Yes Historical Provider  
aspirin delayed-release 81 mg tablet Take 1 Tab by mouth daily. 12/17/15  Yes Cuong Fontenot MD  
  
 
 
ROS Complete ROS reviewed and negative or stable except as noted in HPI. Physical Exam  
Constitutional: She is oriented to person, place, and time. She appears well-nourished. No distress. HENT:  
Head: Normocephalic and atraumatic. Right Ear: Tympanic membrane normal.  
Eyes: EOM are normal. Pupils are equal, round, and reactive to light. No scleral icterus. Neck: Normal range of motion. Neck supple. No JVD present. Cardiovascular: Normal rate, regular rhythm and normal heart sounds. Pulmonary/Chest: Effort normal and breath sounds normal. No respiratory distress. She has no wheezes. She has no rales. Abdominal: Soft. She exhibits no distension. There is no tenderness. Musculoskeletal: Normal range of motion. She exhibits no edema. Palpable DP pulses bilat Lymphadenopathy:  
  She has no cervical adenopathy. Neurological: She is alert and oriented to person, place, and time. She exhibits normal muscle tone. Skin: Skin is warm. No rash noted. Psychiatric: She has a normal mood and affect. Nursing note and vitals reviewed. Prior labs reviewed. Reviewed home PAD screening results Assessment/Plan: ICD-10-CM ICD-9-CM 1. Essential hypertension I10 401.9 2. Encounter for immunization Z23 V03.89 ADMIN INFLUENZA VIRUS VAC INFLUENZA VIRUS VAC QUAD,SPLIT,PRESV FREE SYRINGE IM  
3. Chronic vasomotor rhinitis J30.0 477.9 ipratropium (ATROVENT) 0.03 % nasal spray 4. Anxiety and depression F41.9 300.00 buPROPion (WELLBUTRIN) 75 mg tablet F32.9 311   
5. Hypothyroidism due to acquired atrophy of thyroid E03.4 244.8 DISCONTINUED: levothyroxine (SYNTHROID) 100 mcg tablet 246.8 6. Hyperlipidemia, unspecified hyperlipidemia type E78.5 272.4 7. Acute recurrent maxillary sinusitis J01.01 461.0 azithromycin (ZITHROMAX) 250 mg tablet 8. Eustachian tube dysfunction, bilateral H69.83 381.81 predniSONE (DELTASONE) 10 mg tablet 9. Other seasonal allergic rhinitis J30.2 477.8 10. Osteoporosis, unspecified osteoporosis type, unspecified pathological fracture presence M81.0 733.00   
11. Medicare annual wellness visit, subsequent Z00.00 V70.0 12. Abnormal finding on screening procedure R68.89 796.4 ANKLE BRACHIAL INDEX 13. Peripheral vascular disease (HCC)  I73.9 443. [address] [place] [address] 14. Recurrent depression (HCC) F33.9 296.30 buPROPion (WELLBUTRIN) 75 mg tablet Follow-up Disposition: 
Return in about 2 months (around 12/9/2018), or if symptoms worsen or fail to improve, for thyroid. results and schedule of future studies reviewed with patient 
reviewed diet, exercise and weight 
cardiovascular risk and specific lipid/LDL goals reviewed 
reviewed medications and side effects in detail Refill atrovent nasal  
Continue flonase Prn pred taper and Zpak for trip to [place] Encouraged to see eye - Dr. Monica Jenkins re: eye sx AMMON Increase thyroid to 100 mcg daily each day Resume wellbutrin low dose

## 2018-10-12 DIAGNOSIS — F41.9 ANXIETY AND DEPRESSION: ICD-10-CM

## 2018-10-12 DIAGNOSIS — F32.A ANXIETY AND DEPRESSION: ICD-10-CM

## 2018-10-14 RX ORDER — TRAZODONE HYDROCHLORIDE 50 MG/1
TABLET ORAL
Qty: 180 TAB | Refills: 3 | Status: SHIPPED | OUTPATIENT
Start: 2018-10-14 | End: 2019-09-02 | Stop reason: SDUPTHER

## 2018-10-14 RX ORDER — AMLODIPINE BESYLATE 10 MG/1
TABLET ORAL
Qty: 90 TAB | Refills: 1 | Status: SHIPPED | OUTPATIENT
Start: 2018-10-14 | End: 2019-02-20 | Stop reason: SDUPTHER

## 2018-10-14 RX ORDER — SERTRALINE HYDROCHLORIDE 100 MG/1
TABLET, FILM COATED ORAL
Qty: 180 TAB | Refills: 1 | Status: SHIPPED | OUTPATIENT
Start: 2018-10-14 | End: 2019-02-20 | Stop reason: SDUPTHER

## 2018-10-15 ENCOUNTER — HOSPITAL ENCOUNTER (OUTPATIENT)
Dept: MAMMOGRAPHY | Age: 68
Discharge: HOME | End: 2018-10-15
Payer: MEDICARE

## 2018-10-15 DIAGNOSIS — M81.0 OSTEOPOROSIS, UNSPECIFIED OSTEOPOROSIS TYPE, UNSPECIFIED PATHOLOGICAL FRACTURE PRESENCE: ICD-10-CM

## 2018-10-15 DIAGNOSIS — E03.4 HYPOTHYROIDISM DUE TO ACQUIRED ATROPHY OF THYROID: ICD-10-CM

## 2018-10-15 PROCEDURE — 77080 DXA BONE DENSITY AXIAL: CPT

## 2018-10-15 RX ORDER — LEVOTHYROXINE SODIUM 100 UG/1
100 TABLET ORAL
Qty: 30 TAB | Refills: 0 | Status: SHIPPED | OUTPATIENT
Start: 2018-10-15 | End: 2019-02-20 | Stop reason: SDUPTHER

## 2018-10-15 NOTE — TELEPHONE ENCOUNTER
Pt request for a two week supply for Synthroid be sent to the [facility] [address] [place] listed on file. Pt states she needs to start the medication and does not want to wait for the mail order. Pt request a call back from a nurse today to let her know if prescription will be sent to [facility].   Please advise # [phone]

## 2018-10-17 NOTE — PROGRESS NOTES
Bone density has improved, but is still in the osteoporosis range due to the low bone density in the left forearm.   Continue current medications

## 2018-10-19 DIAGNOSIS — E78.5 HYPERLIPIDEMIA, UNSPECIFIED HYPERLIPIDEMIA TYPE: ICD-10-CM

## 2018-10-19 RX ORDER — ROSUVASTATIN CALCIUM 20 MG/1
TABLET, COATED ORAL
Qty: 90 TAB | Refills: 3 | OUTPATIENT
Start: 2018-10-19

## 2018-12-17 ENCOUNTER — OFFICE VISIT (OUTPATIENT)
Dept: INTERNAL MEDICINE CLINIC | Age: 68
End: 2018-12-17

## 2018-12-17 VITALS
SYSTOLIC BLOOD PRESSURE: 122 MMHG | BODY MASS INDEX: 26.15 KG/M2 | WEIGHT: 133.2 LBS | RESPIRATION RATE: 16 BRPM | HEART RATE: 68 BPM | HEIGHT: 60 IN | OXYGEN SATURATION: 97 % | DIASTOLIC BLOOD PRESSURE: 74 MMHG | TEMPERATURE: 98.5 F

## 2018-12-17 DIAGNOSIS — J32.0 CHRONIC MAXILLARY SINUSITIS: ICD-10-CM

## 2018-12-17 DIAGNOSIS — E03.4 HYPOTHYROIDISM DUE TO ACQUIRED ATROPHY OF THYROID: Primary | ICD-10-CM

## 2018-12-17 DIAGNOSIS — I10 ESSENTIAL HYPERTENSION: ICD-10-CM

## 2018-12-17 DIAGNOSIS — J30.2 OTHER SEASONAL ALLERGIC RHINITIS: ICD-10-CM

## 2018-12-17 DIAGNOSIS — K59.00 CONSTIPATION, UNSPECIFIED CONSTIPATION TYPE: ICD-10-CM

## 2018-12-17 DIAGNOSIS — J30.0 CHRONIC VASOMOTOR RHINITIS: ICD-10-CM

## 2018-12-17 DIAGNOSIS — E78.5 HYPERLIPIDEMIA, UNSPECIFIED HYPERLIPIDEMIA TYPE: ICD-10-CM

## 2018-12-17 RX ORDER — AZELASTINE 1 MG/ML
2 SPRAY, METERED NASAL 2 TIMES DAILY
Qty: 3 BOTTLE | Refills: 3 | Status: SHIPPED | OUTPATIENT
Start: 2018-12-17 | End: 2019-03-21

## 2018-12-17 NOTE — PROGRESS NOTES
Rm 14    Chief Complaint   Patient presents with    Thyroid Problem     f/u     1. Have you been to the ER, urgent care clinic since your last visit? Hospitalized since your last visit? No    2. Have you seen or consulted any other health care providers outside of the [address] [place] [address] since your last visit? Include any pap smears or colon screening. No    Health Maintenance Due   Topic Date Due    Shingrix Vaccine Age 49> (1 of 2) 11/22/2000       Fall Risk Assessment, last 12 mths 10/9/2018   Able to walk? Yes   Fall in past 12 months?  No   no new falls      Learning Assessment 1/9/2018   PRIMARY LEARNER Patient   HIGHEST LEVEL OF EDUCATION - PRIMARY LEARNER  GRADUATED HIGH SCHOOL OR GED   BARRIERS PRIMARY LEARNER NONE   CO-LEARNER CAREGIVER No   PRIMARY LANGUAGE ENGLISH   LEARNER PREFERENCE PRIMARY READING   ANSWERED BY patient   RELATIONSHIP SELF

## 2018-12-17 NOTE — PROGRESS NOTES
HPI:  Presents for f/u thyroid     Feeling better on increased thyroid dose    Hair no longer falling out    Pt reports nasal congestion and drainage - improved, but constant. Despite flonase and atrovent nasal sprays. Past medical, Social, and Family history reviewed    Prior to Admission medications    Medication Sig Start Date End Date Taking? Authorizing Provider   levothyroxine (SYNTHROID) 100 mcg tablet Take 1 Tab by mouth Daily (before breakfast). 10/15/18  Yes Prudence Jones MD   sertraline (ZOLOFT) 100 mg tablet TAKE 2 TABLETS EVERY DAY 10/14/18  Yes Prudence Jones MD   amLODIPine (NORVASC) 10 mg tablet TAKE 1 TABLET EVERY DAY 10/14/18  Yes Prudence Jones MD   traZODone (DESYREL) 50 mg tablet TAKE 2 TABLETS EVERY NIGHT 10/14/18  Yes Prudence Jones MD   ipratropium (ATROVENT) 0.03 % nasal spray 2 Sprays by Both Nostrils route three (3) times daily as needed for Rhinitis. 10/9/18  Yes Prudence Jones MD   buPROPion [facility]) 75 mg tablet Take 1 Tab by mouth two (2) times a day. 10/9/18  Yes Prudence Jones MD   ascorbate calcium (VITAMIN C PO) Take 1 Cap by mouth daily. Yes Provider, Historical   ibandronate (BONIVA) 150 mg tablet TAKE 1 TABLET (150 MG) EVERY THIRTY (30) DAYS 7/26/18  Yes Prudence Jones MD   lisinopril (PRINIVIL, ZESTRIL) 20 mg tablet TAKE 1 TABLET EVERY DAY 7/26/18  Yes Prudence Jones MD   multivitamin with minerals (HAIR,SKIN AND NAILS PO) Take 2 Caps by mouth daily. Yes Provider, Historical   fluticasone (FLONASE) 50 mcg/actuation nasal spray 2 Sprays by Both Nostrils route daily. 7/20/18  Yes Prudence Jones MD   rosuvastatin (CRESTOR) 20 mg tablet TAKE 1 TABLET EVERY NIGHT 5/3/18  Yes Sonia Pinto MD   LINZESS 145 mcg cap capsule TAKE 1 CAPSULE BY MOUTH DAILY (BEFORE BREAKFAST). Patient taking differently: TAKE 1 CAPSULE BY MOUTH DAILY (BEFORE BREAKFAST).  As needed 12/15/17  Yes Yuliet Flores, AVEL   fenofibrate nanocrystallized (TRICOR) 145 mg tablet TAKE 1 TABLET DAILY FOR HYPERLIPIDEMIA 10/20/17  Yes Gustavo De Jesus MD   esomeprazole (NEXIUM) 20 mg capsule TAKE 1 CAPSULE EVERY DAY AS NEEDED 10/13/17  Yes Gustavo De Jesus MD   cycloSPORINE (RESTASIS) 0.05 % ophthalmic emulsion Administer 1 Drop to both eyes two (2) times a day. Yes Provider, Historical   multivitamin (ONE A DAY) tablet Take 1 Tab by mouth daily. Yes Provider, Historical   CALCIUM CARBONATE/VITAMIN D3 (CALCIUM + D PO) Take 1 Cap by mouth daily. Yes Provider, Historical   aspirin delayed-release 81 mg tablet Take 1 Tab by mouth daily. 12/17/15  Yes Elizabeth Sylvester MD   predniSONE (DELTASONE) 10 mg tablet Taper daily. 60mg x1, 50mg x 1, 40mg x 1, 30mg x 1, 20mg x 1, 10mg x 1 10/9/18   Gustavo De Jesus MD          ROS  Complete ROS reviewed and negative or stable except as noted in HPI. Physical Exam   Constitutional: She is oriented to person, place, and time. She appears well-nourished. No distress. HENT:   Head: Normocephalic and atraumatic. Right Ear: Tympanic membrane normal.   Eyes: EOM are normal. Pupils are equal, round, and reactive to light. No scleral icterus. Neck: Normal range of motion. Neck supple. No JVD present. Cardiovascular: Normal rate, regular rhythm and normal heart sounds. Pulmonary/Chest: Effort normal and breath sounds normal. No respiratory distress. She has no wheezes. She has no rales. Abdominal: Soft. She exhibits no distension. There is no tenderness. Musculoskeletal: Normal range of motion. She exhibits no edema. Lymphadenopathy:     She has no cervical adenopathy. Neurological: She is alert and oriented to person, place, and time. She exhibits normal muscle tone. Skin: Skin is warm. No rash noted. Psychiatric: She has a normal mood and affect. Nursing note and vitals reviewed. Prior labs reviewed. Assessment/Plan:    ICD-10-CM ICD-9-CM    1.  Hypothyroidism due to acquired atrophy of thyroid E03.4 244.8 T4, FREE     246.8 TSH 3RD GENERATION   2. Chronic maxillary sinusitis J32.0 473.0 azelastine (ASTELIN) 137 mcg (0.1 %) nasal spray   3. Hyperlipidemia, unspecified hyperlipidemia type E78.5 272.4    4. Other seasonal allergic rhinitis J30.2 477.8    5. Chronic vasomotor rhinitis J30.0 477.9    6. Essential hypertension I10 401.9    7. Constipation, unspecified constipation type K59.00 564.00      Follow-up Disposition:  Return in about 3 months (around 3/17/2019), or if symptoms worsen or fail to improve, for thyroid, cholesterol.    results and schedule of future studies reviewed with patient  reviewed diet, exercise and weight   cardiovascular risk and specific lipid/LDL goals reviewed  reviewed medications and side effects in detail  Continue flonase and atrovent  Add back astelin   Check thyroid  Continue other current medications

## 2018-12-18 LAB
T4 FREE SERPL-MCNC: 1.71 NG/DL (ref 0.82–1.77)
TSH SERPL DL<=0.005 MIU/L-ACNC: 0.76 UIU/ML (ref 0.45–4.5)

## 2019-01-04 ENCOUNTER — OFFICE VISIT (OUTPATIENT)
Dept: INTERNAL MEDICINE CLINIC | Age: 69
End: 2019-01-04

## 2019-01-04 VITALS
SYSTOLIC BLOOD PRESSURE: 136 MMHG | OXYGEN SATURATION: 97 % | DIASTOLIC BLOOD PRESSURE: 76 MMHG | RESPIRATION RATE: 18 BRPM | TEMPERATURE: 98.9 F | HEART RATE: 83 BPM | HEIGHT: 60 IN | BODY MASS INDEX: 26.23 KG/M2 | WEIGHT: 133.6 LBS

## 2019-01-04 DIAGNOSIS — I10 ESSENTIAL HYPERTENSION: ICD-10-CM

## 2019-01-04 DIAGNOSIS — J30.0 CHRONIC VASOMOTOR RHINITIS: ICD-10-CM

## 2019-01-04 DIAGNOSIS — J30.2 OTHER SEASONAL ALLERGIC RHINITIS: ICD-10-CM

## 2019-01-04 DIAGNOSIS — H69.83 EUSTACHIAN TUBE DYSFUNCTION, BILATERAL: ICD-10-CM

## 2019-01-04 DIAGNOSIS — E03.4 HYPOTHYROIDISM DUE TO ACQUIRED ATROPHY OF THYROID: ICD-10-CM

## 2019-01-04 DIAGNOSIS — E78.5 HYPERLIPIDEMIA, UNSPECIFIED HYPERLIPIDEMIA TYPE: ICD-10-CM

## 2019-01-04 DIAGNOSIS — J20.9 BRONCHITIS, ACUTE, WITH BRONCHOSPASM: Primary | ICD-10-CM

## 2019-01-04 RX ORDER — PREDNISONE 10 MG/1
TABLET ORAL
Qty: 21 TAB | Refills: 0 | Status: SHIPPED | OUTPATIENT
Start: 2019-01-04 | End: 2019-03-21

## 2019-01-04 NOTE — PROGRESS NOTES
HPI:  Presents for f/u resp illness    Out of town and felt sick    Rx sent in per telephone and [facility] encounters. Pt feeling better, but not back to baseline. Past medical, Social, and Family history reviewed    Prior to Admission medications    Medication Sig Start Date End Date Taking? Authorizing Provider   azelastine (ASTELIN) 137 mcg (0.1 %) nasal spray 2 Sprays by Both Nostrils route two (2) times a day. Use in each nostril as directed 12/17/18  Yes Zakiya Vasquez MD   levothyroxine (SYNTHROID) 100 mcg tablet Take 1 Tab by mouth Daily (before breakfast). 10/15/18  Yes Zakiya Vasquez MD   sertraline (ZOLOFT) 100 mg tablet TAKE 2 TABLETS EVERY DAY 10/14/18  Yes Zakiya Vasquez MD   amLODIPine (NORVASC) 10 mg tablet TAKE 1 TABLET EVERY DAY 10/14/18  Yes Zakiya Vasquez MD   traZODone (DESYREL) 50 mg tablet TAKE 2 TABLETS EVERY NIGHT 10/14/18  Yes Zakiya Vasquez MD   ipratropium (ATROVENT) 0.03 % nasal spray 2 Sprays by Both Nostrils route three (3) times daily as needed for Rhinitis. 10/9/18  Yes Zakiya Vasquez MD   buPROPion [facility]) 75 mg tablet Take 1 Tab by mouth two (2) times a day. 10/9/18  Yes Zakiya Vasquez MD   ascorbate calcium (VITAMIN C PO) Take 1 Cap by mouth daily. Yes Provider, Historical   ibandronate (BONIVA) 150 mg tablet TAKE 1 TABLET (150 MG) EVERY THIRTY (30) DAYS 7/26/18  Yes Zakiya Vasquez MD   lisinopril (PRINIVIL, ZESTRIL) 20 mg tablet TAKE 1 TABLET EVERY DAY 7/26/18  Yes Zakiya Vasquez MD   multivitamin with minerals (HAIR,SKIN AND NAILS PO) Take 2 Caps by mouth daily. Yes Provider, Historical   fluticasone (FLONASE) 50 mcg/actuation nasal spray 2 Sprays by Both Nostrils route daily.  7/20/18  Yes Zakiya Vasquez MD   rosuvastatin (CRESTOR) 20 mg tablet TAKE 1 TABLET EVERY NIGHT 5/3/18  Yes Terrence Romeo MD   fenofibrate nanocrystallized (TRICOR) 145 mg tablet TAKE 1 TABLET DAILY FOR HYPERLIPIDEMIA 10/20/17  Yes Subhash Suggs, Tono Sanchez MD   esomeprazole (NEXIUM) 20 mg capsule TAKE 1 CAPSULE EVERY DAY AS NEEDED 10/13/17  Yes Marylu Chong MD   cycloSPORINE (RESTASIS) 0.05 % ophthalmic emulsion Administer 1 Drop to both eyes two (2) times a day. Yes Provider, Historical   multivitamin (ONE A DAY) tablet Take 1 Tab by mouth daily. Yes Provider, Historical   CALCIUM CARBONATE/VITAMIN D3 (CALCIUM + D PO) Take 1 Cap by mouth daily. Yes Provider, Historical   aspirin delayed-release 81 mg tablet Take 1 Tab by mouth daily. 12/17/15  Yes Anastasia Treadwell MD          ROS  Complete ROS reviewed and negative or stable except as noted in HPI. Physical Exam   Constitutional: She is oriented to person, place, and time. She appears well-nourished. No distress. HENT:   Head: Normocephalic and atraumatic. Right Ear: Tympanic membrane normal.   Eyes: EOM are normal. Pupils are equal, round, and reactive to light. No scleral icterus. Neck: Normal range of motion. Neck supple. No JVD present. Cardiovascular: Normal rate, regular rhythm and normal heart sounds. Pulmonary/Chest: Effort normal. No respiratory distress. She has wheezes (scant with forced exp). She has no rales. Bronchospastic cough   Abdominal: Soft. She exhibits no distension. There is no tenderness. Musculoskeletal: Normal range of motion. She exhibits no edema. Lymphadenopathy:     She has no cervical adenopathy. Neurological: She is alert and oriented to person, place, and time. She exhibits normal muscle tone. Skin: Skin is warm. No rash noted. Psychiatric: She has a normal mood and affect. Nursing note and vitals reviewed. Prior labs reviewed. Assessment/Plan:    ICD-10-CM ICD-9-CM    1. Bronchitis, acute, with bronchospasm J20.9 466.0    2. Eustachian tube dysfunction, bilateral H69.83 381.81 predniSONE (DELTASONE) 10 mg tablet   3. Other seasonal allergic rhinitis J30.2 477.8    4.  Chronic vasomotor rhinitis J30.0 477.9    5. Essential hypertension I10 401.9    6. Hyperlipidemia, unspecified hyperlipidemia type E78.5 272.4    7. Hypothyroidism due to acquired atrophy of thyroid E03.4 244.8      246.8      Follow-up Disposition:  Return in about 2 months (around 3/18/2019), or if symptoms worsen or fail to improve, for thyroid, blood pressure.   results and schedule of future studies reviewed with patient  reviewed diet, exercise and weight  cardiovascular risk and specific lipid/LDL goals reviewed  reviewed medications and side effects in detail   Repeat pred taper  Continue other current medications

## 2019-01-04 NOTE — PROGRESS NOTES
Rm 15    Preeti Barroso is a 76 y.o. female    Chief Complaint   Patient presents with    Cold Symptoms     1. Have you been to the ER, urgent care clinic since your last visit? Hospitalized since your last visit? No     2. Have you seen or consulted any other health care providers outside of the [address] [place] [address] since your last visit? Include any pap smears or colon screening.   No     Visit Vitals  BP (!) 156/91   Pulse 83   Temp 98.9 °F (37.2 °C) (Oral)   Resp 18   Ht 5' (1.524 m)   Wt 133 lb 9.6 oz (60.6 kg)   SpO2 97%   BMI 26.09 kg/m²

## 2019-02-20 DIAGNOSIS — E03.4 HYPOTHYROIDISM DUE TO ACQUIRED ATROPHY OF THYROID: ICD-10-CM

## 2019-02-20 DIAGNOSIS — F32.A ANXIETY AND DEPRESSION: ICD-10-CM

## 2019-02-20 DIAGNOSIS — F41.9 ANXIETY AND DEPRESSION: ICD-10-CM

## 2019-02-20 RX ORDER — LEVOTHYROXINE SODIUM 88 UG/1
TABLET ORAL
Qty: 98 TAB | Refills: 3 | Status: SHIPPED | OUTPATIENT
Start: 2019-02-20 | End: 2019-04-05 | Stop reason: SDUPTHER

## 2019-02-20 RX ORDER — LEVOTHYROXINE SODIUM 100 UG/1
TABLET ORAL
Qty: 90 TAB | Refills: 1 | Status: SHIPPED | OUTPATIENT
Start: 2019-02-20 | End: 2019-02-20 | Stop reason: DRUGHIGH

## 2019-02-20 RX ORDER — AMLODIPINE BESYLATE 10 MG/1
TABLET ORAL
Qty: 90 TAB | Refills: 1 | Status: SHIPPED | OUTPATIENT
Start: 2019-02-20 | End: 2019-07-01 | Stop reason: SDUPTHER

## 2019-02-20 RX ORDER — SERTRALINE HYDROCHLORIDE 100 MG/1
TABLET, FILM COATED ORAL
Qty: 180 TAB | Refills: 1 | Status: SHIPPED | OUTPATIENT
Start: 2019-02-20 | End: 2019-07-01 | Stop reason: SDUPTHER

## 2019-02-26 ENCOUNTER — TELEPHONE (OUTPATIENT)
Dept: ENDOCRINOLOGY | Age: 69
End: 2019-02-26

## 2019-02-26 NOTE — TELEPHONE ENCOUNTER
----- Message from [facility] sent at 2/26/2019 11:43 AM EST -----  Regarding: Dr. Abdelrahman Hoffman: [phone]  Pt stated her pcp did labs and she wants Dr. Markos Bob to  take a look at her labs and see if she needs to continue her 80 dosage or schedule an appt to see Dr. Markos Bob?

## 2019-02-26 NOTE — TELEPHONE ENCOUNTER
2/26/2019  1:28 PM      Please see message from answering service regarding results and medication.         Thanks

## 2019-02-27 NOTE — TELEPHONE ENCOUNTER
Called pt mobile phone. Left message in regards to giving lab/medication information to pt per Dr. Buzz Price. Awaiting call back.

## 2019-02-27 NOTE — TELEPHONE ENCOUNTER
TSH has progressively decreased and Free T4 has increased. Perhaps she has lower needs with the weight loss of 5-8 lbs? I would recommend she lower dose slightly by taking 1/2 tablet on Sundays, for 6.5 tablets/week and average daily dose of 82 mcg/day.

## 2019-03-10 RX ORDER — PANTOPRAZOLE SODIUM 40 MG/1
TABLET, DELAYED RELEASE ORAL
Qty: 90 TAB | Refills: 3 | Status: SHIPPED | OUTPATIENT
Start: 2019-03-10

## 2019-03-10 RX ORDER — FENOFIBRATE 160 MG/1
TABLET ORAL
Qty: 90 TAB | Refills: 3 | Status: SHIPPED | OUTPATIENT
Start: 2019-03-10 | End: 2020-01-10

## 2019-03-18 ENCOUNTER — OFFICE VISIT (OUTPATIENT)
Dept: INTERNAL MEDICINE CLINIC | Age: 69
End: 2019-03-18

## 2019-03-18 VITALS
OXYGEN SATURATION: 95 % | WEIGHT: 135.8 LBS | RESPIRATION RATE: 17 BRPM | TEMPERATURE: 98.4 F | DIASTOLIC BLOOD PRESSURE: 69 MMHG | BODY MASS INDEX: 26.66 KG/M2 | HEIGHT: 60 IN | HEART RATE: 69 BPM | SYSTOLIC BLOOD PRESSURE: 114 MMHG

## 2019-03-18 DIAGNOSIS — E55.9 VITAMIN D DEFICIENCY: ICD-10-CM

## 2019-03-18 DIAGNOSIS — J30.2 OTHER SEASONAL ALLERGIC RHINITIS: ICD-10-CM

## 2019-03-18 DIAGNOSIS — K59.09 CHRONIC CONSTIPATION: ICD-10-CM

## 2019-03-18 DIAGNOSIS — E78.5 HYPERLIPIDEMIA, UNSPECIFIED HYPERLIPIDEMIA TYPE: ICD-10-CM

## 2019-03-18 DIAGNOSIS — R73.9 HYPERGLYCEMIA: ICD-10-CM

## 2019-03-18 DIAGNOSIS — M81.0 OSTEOPOROSIS, UNSPECIFIED OSTEOPOROSIS TYPE, UNSPECIFIED PATHOLOGICAL FRACTURE PRESENCE: ICD-10-CM

## 2019-03-18 DIAGNOSIS — I10 ESSENTIAL HYPERTENSION: ICD-10-CM

## 2019-03-18 DIAGNOSIS — R25.2 LEG CRAMPING: ICD-10-CM

## 2019-03-18 DIAGNOSIS — J01.01 ACUTE RECURRENT MAXILLARY SINUSITIS: ICD-10-CM

## 2019-03-18 DIAGNOSIS — E03.4 HYPOTHYROIDISM DUE TO ACQUIRED ATROPHY OF THYROID: Primary | ICD-10-CM

## 2019-03-18 RX ORDER — LANOLIN ALCOHOL/MO/W.PET/CERES
400 CREAM (GRAM) TOPICAL DAILY
Qty: 30 TAB | Refills: 5 | Status: SHIPPED | OUTPATIENT
Start: 2019-03-18 | End: 2019-04-05 | Stop reason: SDUPTHER

## 2019-03-18 RX ORDER — AZITHROMYCIN 250 MG/1
TABLET, FILM COATED ORAL
Qty: 6 TAB | Refills: 0 | Status: SHIPPED | OUTPATIENT
Start: 2019-03-18 | End: 2019-03-23

## 2019-03-18 NOTE — PROGRESS NOTES
Exam Room 18    Akhil Rodriguez is a 76 y.o. female    Chief Complaint   Patient presents with    Thyroid Problem     follow up    Sinus Infection     Possible sinus infection     1. Have you been to the ER, urgent care clinic since your last visit? Hospitalized since your last visit? No    2. Have you seen or consulted any other health care providers outside of the [address] [place] [address] since your last visit? Include any pap smears or colon screening.  No     Health Maintenance Due   Topic Date Due    Shingrix Vaccine Age 49> (1 of 2) 11/22/2000

## 2019-03-18 NOTE — PROGRESS NOTES
HPI:  Presents for f/u thyroid, sinus dx    Pt felt better on lower thyroid dose initially  Then, resumed having some sweatiness    Using flonase, astelin, zyrtec  But, still +drainage  +chills   Some sinus pressure    Pt reports dry eyes  Has had eye procedure and uses eye gtts to help maintain it. Leg cramping    Pt inquires re: CBD oil for mood, arthritis    Past medical, Social, and Family history reviewed    Prior to Admission medications    Medication Sig Start Date End Date Taking? Authorizing Provider   pantoprazole (PROTONIX) 40 mg tablet TAKE 1 TABLET EVERY DAY 3/10/19  Yes Ellen Koo MD   fenofibrate (LOFIBRA) 160 mg tablet TAKE 1 TABLET EVERY DAY 3/10/19  Yes Ellen oKo MD   sertraline (ZOLOFT) 100 mg tablet TAKE 2 TABLETS EVERY DAY 2/20/19  Yes Ellen Koo MD   amLODIPine (NORVASC) 10 mg tablet TAKE 1 TABLET EVERY DAY 2/20/19  Yes Ellen Koo MD   levothyroxine (SYNTHROID) 88 mcg tablet TAKE 1 TABLET DAILY MOST DAYS AND 1 & 1/2 TABLETS DAILY ON SUNDAYS (7.5 TABLETS TOTAL/WEEK = 94 MCG/DAY AVERAGE DOSE) 2/20/19  Yes Ellen Koo MD   azelastine (ASTELIN) 137 mcg (0.1 %) nasal spray 2 Sprays by Both Nostrils route two (2) times a day. Use in each nostril as directed 12/17/18  Yes Ellen Koo MD   traZODone (DESYREL) 50 mg tablet TAKE 2 TABLETS EVERY NIGHT 10/14/18  Yes Ellen Koo MD   ascorbate calcium (VITAMIN C PO) Take 1 Cap by mouth daily. Yes Provider, Historical   ibandronate (BONIVA) 150 mg tablet TAKE 1 TABLET (150 MG) EVERY THIRTY (30) DAYS 7/26/18  Yes Ellen Koo MD   lisinopril (PRINIVIL, ZESTRIL) 20 mg tablet TAKE 1 TABLET EVERY DAY 7/26/18  Yes Ellen Koo MD   multivitamin with minerals (HAIR,SKIN AND NAILS PO) Take 2 Caps by mouth daily. Yes Provider, Historical   fluticasone (FLONASE) 50 mcg/actuation nasal spray 2 Sprays by Both Nostrils route daily.  7/20/18  Yes Ellen Koo MD   rosuvastatin (CRESTOR) 20 mg tablet TAKE 1 TABLET EVERY NIGHT 5/3/18  Yes Lorena Londono MD   multivitamin (ONE A DAY) tablet Take 1 Tab by mouth daily. Yes Provider, Historical   CALCIUM CARBONATE/VITAMIN D3 (CALCIUM + D PO) Take 1 Cap by mouth daily. Yes Provider, Historical   aspirin delayed-release 81 mg tablet Take 1 Tab by mouth daily. 12/17/15  Yes Lorena Londono MD   predniSONE (DELTASONE) 10 mg tablet Taper daily. 60mg x1, 50mg x 1, 40mg x 1, 30mg x 1, 20mg x 1, 10mg x 1  Patient not taking: Reported on 3/18/2019 1/4/19   Laurent Potter MD   ipratropium (ATROVENT) 0.03 % nasal spray 2 Sprays by Both Nostrils route three (3) times daily as needed for Rhinitis. Patient not taking: Reported on 3/18/2019 10/9/18   Laurent Potter MD   buPROPion [facility] 75 mg tablet Take 1 Tab by mouth two (2) times a day. Patient not taking: Reported on 3/18/2019 10/9/18   Laurent Potter MD   cycloSPORINE (RESTASIS) 0.05 % ophthalmic emulsion Administer 1 Drop to both eyes two (2) times a day. Provider, Historical          ROS  Complete ROS reviewed and negative or stable except as noted in HPI. Physical Exam   Constitutional: She is oriented to person, place, and time. She appears well-nourished. No distress. HENT:   Head: Normocephalic and atraumatic. Right Ear: Tympanic membrane normal.   Nose: Sinus tenderness present. Eyes: Pupils are equal, round, and reactive to light. EOM are normal. No scleral icterus. Neck: Normal range of motion. Neck supple. No JVD present. Cardiovascular: Normal rate, regular rhythm and normal heart sounds. Pulmonary/Chest: Effort normal and breath sounds normal. No respiratory distress. She has no wheezes. She has no rales. Abdominal: Soft. She exhibits no distension. There is no tenderness. Musculoskeletal: Normal range of motion. She exhibits no edema. Lymphadenopathy:     She has no cervical adenopathy.    Neurological: She is alert and oriented to person, place, and time. She exhibits normal muscle tone. Skin: Skin is warm. No rash noted. Psychiatric: She has a normal mood and affect. Nursing note and vitals reviewed. Prior labs reviewed. Assessment/Plan:    ICD-10-CM ICD-9-CM    1. Hypothyroidism due to acquired atrophy of thyroid E03.4 244.8 T4, FREE     246.8 TSH 3RD GENERATION   2. Hyperlipidemia, unspecified hyperlipidemia type E78.5 272.4 CK      LIPID PANEL      METABOLIC PANEL, COMPREHENSIVE   3. Chronic constipation K59.09 564.00    4. Osteoporosis, unspecified osteoporosis type, unspecified pathological fracture presence M81.0 733.00    5. Other seasonal allergic rhinitis J30.2 477.8    6. Essential hypertension I10 401.9 CBC WITH AUTOMATED DIFF   7. Hyperglycemia R73.9 790.29 HEMOGLOBIN A1C WITH EAG   8. Vitamin D deficiency E55.9 268.9 VITAMIN D, 25 HYDROXY   9. Acute recurrent maxillary sinusitis J01.01 461.0 azithromycin (ZITHROMAX) 250 mg tablet   10. Leg cramping R25.2 729.82 magnesium oxide (MAG-OX) 400 mg tablet     Follow-up and Dispositions    · Return in about 7 months (around 10/18/2019), or if symptoms worsen or fail to improve, for Medicare Wellness Visit, blood pressure, cholesterol.        results and schedule of future studies reviewed with patient  reviewed diet, exercise and weight   cardiovascular risk and specific lipid/LDL goals reviewed  reviewed medications and side effects in detail   Fasting labs this week  Pt to review the thyroid with endocrine this week  azithro   Trial of allegra instead of zyrtec  Trial of magnesium  Agree to trial of CBD oil - but acknowledged limited data

## 2019-03-20 ENCOUNTER — LAB ONLY (OUTPATIENT)
Dept: INTERNAL MEDICINE CLINIC | Age: 69
End: 2019-03-20

## 2019-03-20 DIAGNOSIS — R73.9 HYPERGLYCEMIA: ICD-10-CM

## 2019-03-20 DIAGNOSIS — E55.9 VITAMIN D DEFICIENCY: ICD-10-CM

## 2019-03-20 DIAGNOSIS — E03.4 HYPOTHYROIDISM DUE TO ACQUIRED ATROPHY OF THYROID: ICD-10-CM

## 2019-03-20 DIAGNOSIS — I10 ESSENTIAL HYPERTENSION: ICD-10-CM

## 2019-03-20 DIAGNOSIS — E78.5 HYPERLIPIDEMIA, UNSPECIFIED HYPERLIPIDEMIA TYPE: ICD-10-CM

## 2019-03-21 ENCOUNTER — OFFICE VISIT (OUTPATIENT)
Dept: ENDOCRINOLOGY | Age: 69
End: 2019-03-21

## 2019-03-21 VITALS
HEART RATE: 68 BPM | SYSTOLIC BLOOD PRESSURE: 119 MMHG | HEIGHT: 60 IN | DIASTOLIC BLOOD PRESSURE: 75 MMHG | WEIGHT: 136 LBS | BODY MASS INDEX: 26.7 KG/M2

## 2019-03-21 DIAGNOSIS — E89.0 HYPOTHYROIDISM FOLLOWING RADIOIODINE THERAPY: Primary | ICD-10-CM

## 2019-03-21 LAB
25(OH)D3+25(OH)D2 SERPL-MCNC: 59.7 NG/ML (ref 30–100)
ALBUMIN SERPL-MCNC: 4.3 G/DL (ref 3.6–4.8)
ALBUMIN/GLOB SERPL: 2.2 {RATIO} (ref 1.2–2.2)
ALP SERPL-CCNC: 32 IU/L (ref 39–117)
ALT SERPL-CCNC: 15 IU/L (ref 0–32)
AST SERPL-CCNC: 25 IU/L (ref 0–40)
BASOPHILS # BLD AUTO: 0.1 X10E3/UL (ref 0–0.2)
BASOPHILS NFR BLD AUTO: 1 %
BILIRUB SERPL-MCNC: 0.3 MG/DL (ref 0–1.2)
BUN SERPL-MCNC: 25 MG/DL (ref 8–27)
BUN/CREAT SERPL: 25 (ref 12–28)
CALCIUM SERPL-MCNC: 10.2 MG/DL (ref 8.7–10.3)
CHLORIDE SERPL-SCNC: 103 MMOL/L (ref 96–106)
CHOLEST SERPL-MCNC: 138 MG/DL (ref 100–199)
CK SERPL-CCNC: 48 U/L (ref 24–173)
CO2 SERPL-SCNC: 25 MMOL/L (ref 20–29)
CREAT SERPL-MCNC: 1.02 MG/DL (ref 0.57–1)
EOSINOPHIL # BLD AUTO: 0.3 X10E3/UL (ref 0–0.4)
EOSINOPHIL NFR BLD AUTO: 7 %
ERYTHROCYTE [DISTWIDTH] IN BLOOD BY AUTOMATED COUNT: 13.9 % (ref 12.3–15.4)
EST. AVERAGE GLUCOSE BLD GHB EST-MCNC: 111 MG/DL
GLOBULIN SER CALC-MCNC: 2 G/DL (ref 1.5–4.5)
GLUCOSE SERPL-MCNC: 90 MG/DL (ref 65–99)
HBA1C MFR BLD: 5.5 % (ref 4.8–5.6)
HCT VFR BLD AUTO: 37.7 % (ref 34–46.6)
HDLC SERPL-MCNC: 58 MG/DL
HGB BLD-MCNC: 12.5 G/DL (ref 11.1–15.9)
IMM GRANULOCYTES # BLD AUTO: 0 X10E3/UL (ref 0–0.1)
IMM GRANULOCYTES NFR BLD AUTO: 0 %
LDLC SERPL CALC-MCNC: 62 MG/DL (ref 0–99)
LYMPHOCYTES # BLD AUTO: 1.3 X10E3/UL (ref 0.7–3.1)
LYMPHOCYTES NFR BLD AUTO: 31 %
MCH RBC QN AUTO: 28.3 PG (ref 26.6–33)
MCHC RBC AUTO-ENTMCNC: 33.2 G/DL (ref 31.5–35.7)
MCV RBC AUTO: 86 FL (ref 79–97)
MONOCYTES # BLD AUTO: 0.5 X10E3/UL (ref 0.1–0.9)
MONOCYTES NFR BLD AUTO: 11 %
NEUTROPHILS # BLD AUTO: 2.1 X10E3/UL (ref 1.4–7)
NEUTROPHILS NFR BLD AUTO: 50 %
PLATELET # BLD AUTO: 325 X10E3/UL (ref 150–379)
POTASSIUM SERPL-SCNC: 4.5 MMOL/L (ref 3.5–5.2)
PROT SERPL-MCNC: 6.3 G/DL (ref 6–8.5)
RBC # BLD AUTO: 4.41 X10E6/UL (ref 3.77–5.28)
SODIUM SERPL-SCNC: 145 MMOL/L (ref 134–144)
T4 FREE SERPL-MCNC: 1.43 NG/DL (ref 0.82–1.77)
TRIGL SERPL-MCNC: 92 MG/DL (ref 0–149)
TSH SERPL DL<=0.005 MIU/L-ACNC: 2.05 UIU/ML (ref 0.45–4.5)
VLDLC SERPL CALC-MCNC: 18 MG/DL (ref 5–40)
WBC # BLD AUTO: 4.2 X10E3/UL (ref 3.4–10.8)

## 2019-03-21 NOTE — PATIENT INSTRUCTIONS
Hypothyroidism following radioactive iodine:    Continue 88 mcg    May be that  has changed. Possible as well than antibodies to thyroid may be affecting levels via any residual thyroid tissue.

## 2019-03-21 NOTE — PROGRESS NOTES
History of Present Illness: Carroll Valdez is a 76 y.o. female presents for follow-up of hypothyroidism  She had two treatments with METCALF in late 1990s for Graves disease. TSH was low in 2016 and dose was decreased to 88 mcg. Dose was then stable until late 2018 when TSH increased. Dose then changed to 100 mcg. TSH was low-normal and Free T4 high and she was changed back to 88 mcg    In retrospect  could have changed     Chronic dry eyes remain a problem for her. Following with eye doctor. Social:  She is looking for a place to live. Now sure who she'll live with or where she'll live. Past Medical History:   Diagnosis Date    Arthritis     Carpal tunnel syndrome, bilateral     Depression     H/O colonoscopy 2011    no polyps, + diverticulosis and nonbleeding internal hemorroids    Hypercholesterolemia     Hypertension     Lyme disease 2007    Menopause 1999    Osteoporosis 11/17/2016    Thyroid disease     Trochanteric bursitis      Current Outpatient Medications   Medication Sig    azithromycin (ZITHROMAX) 250 mg tablet Take 2 tablets today, then take 1 tablet daily    magnesium oxide (MAG-OX) 400 mg tablet Take 1 Tab by mouth daily.  pantoprazole (PROTONIX) 40 mg tablet TAKE 1 TABLET EVERY DAY    fenofibrate (LOFIBRA) 160 mg tablet TAKE 1 TABLET EVERY DAY    sertraline (ZOLOFT) 100 mg tablet TAKE 2 TABLETS EVERY DAY    amLODIPine (NORVASC) 10 mg tablet TAKE 1 TABLET EVERY DAY    levothyroxine (SYNTHROID) 88 mcg tablet TAKE 1 TABLET DAILY MOST DAYS AND 1 & 1/2 TABLETS DAILY ON SUNDAYS (7.5 TABLETS TOTAL/WEEK = 94 MCG/DAY AVERAGE DOSE)    traZODone (DESYREL) 50 mg tablet TAKE 2 TABLETS EVERY NIGHT    ascorbate calcium (VITAMIN C PO) Take 1 Cap by mouth daily.     ibandronate (BONIVA) 150 mg tablet TAKE 1 TABLET (150 MG) EVERY THIRTY (30) DAYS    lisinopril (PRINIVIL, ZESTRIL) 20 mg tablet TAKE 1 TABLET EVERY DAY    multivitamin with minerals (HAIR,SKIN AND NAILS PO) Take 2 Caps by mouth daily.  fluticasone (FLONASE) 50 mcg/actuation nasal spray 2 Sprays by Both Nostrils route daily.  rosuvastatin (CRESTOR) 20 mg tablet TAKE 1 TABLET EVERY NIGHT    multivitamin (ONE A DAY) tablet Take 1 Tab by mouth daily.  CALCIUM CARBONATE/VITAMIN D3 (CALCIUM + D PO) Take 1 Cap by mouth daily.  aspirin delayed-release 81 mg tablet Take 1 Tab by mouth daily. No current facility-administered medications for this visit. Allergies   Allergen Reactions    Mold Sneezing    Wellbutrin [Bupropion Hcl] Nausea Only     And dizziness       Review of Systems:  - Eyes: + dry eyes. - Cardiovascular: no chest pain  - Respiratory: no shortness of breath  - Musculoskeletal: no myalgias  - Neurological: no numbness/tingling in extremities    Physical Examination:  Visit Vitals  /75 (BP 1 Location: Left arm, BP Patient Position: Sitting)   Pulse 68   Ht 5' (1.524 m)   Wt 136 lb (61.7 kg)   BMI 26.56 kg/m²   -   - General: pleasant, no distress, normal gait   HEENT: hearing intact, EOMI, + scleral injection without icterus  - Cardiovascular: regular, normal rate   - Respiratory: normal effort  - Integumentary: no edema  - Psychiatric: normal mood and affect    Data Reviewed:   Lab Results   Component Value Date/Time    Hemoglobin A1c 5.5 03/20/2019 08:16 AM      Lab Results   Component Value Date/Time    Sodium 145 03/20/2019 08:16 AM    Potassium 4.5 03/20/2019 08:16 AM    Creatinine 1.02 03/20/2019 08:16 AM        Lab Results   Component Value Date/Time    Cholesterol, total 138 03/20/2019 08:16 AM    HDL Cholesterol 58 03/20/2019 08:16 AM    LDL, calculated 62 03/20/2019 08:16 AM    Triglyceride 92 03/20/2019 08:16 AM      Lab Results   Component Value Date/Time    TSH 2.050 03/20/2019 08:16 AM    T4, Free 1.43 03/20/2019 08:16 AM        Assessment/Plan:   1.  Hypothyroidism following radioiodine therapy   - continue Synthroid 88 mcg  - variability in her labs could be explained by a  change last summer or due to presence of antibodies. Repeat labs in another 3-4 months. Will check antibodies at that time for Graves (present but not elevated in 2017) and Hashimoto's thyroiditis (negative in 2017)     Patient Instructions   Hypothyroidism following radioactive iodine:    Continue 88 mcg    May be that  has changed. Possible as well than antibodies to thyroid may be affecting levels via any residual thyroid tissue. Follow-up and Dispositions    · Return in about 4 months (around 7/21/2019).          Copy sent to:

## 2019-03-21 NOTE — PROGRESS NOTES
Thyroid dose appears to be appropriate and all other labs are either normal or stable and at goal.  Keep up the good work!    Continue your current medications

## 2019-04-05 DIAGNOSIS — E03.4 HYPOTHYROIDISM DUE TO ACQUIRED ATROPHY OF THYROID: ICD-10-CM

## 2019-04-05 DIAGNOSIS — R25.2 LEG CRAMPING: ICD-10-CM

## 2019-04-05 NOTE — TELEPHONE ENCOUNTER
----- Message from Jair Luna sent at 4/5/2019 12:25 PM EDT -----  Regarding: Dr Dixon/rx refill  Pt (p) [phone], said she needs her levothyroxine    88 mcg called into her mail order [facility] and her magnesium oxide 400 mg for her mail order service also, pt said it is in her chart. Please call to confirm it has been sent.

## 2019-04-08 ENCOUNTER — OFFICE VISIT (OUTPATIENT)
Dept: INTERNAL MEDICINE CLINIC | Age: 69
End: 2019-04-08

## 2019-04-08 VITALS
WEIGHT: 134.4 LBS | SYSTOLIC BLOOD PRESSURE: 120 MMHG | HEART RATE: 76 BPM | RESPIRATION RATE: 19 BRPM | TEMPERATURE: 98 F | BODY MASS INDEX: 26.39 KG/M2 | DIASTOLIC BLOOD PRESSURE: 75 MMHG | OXYGEN SATURATION: 95 % | HEIGHT: 60 IN

## 2019-04-08 DIAGNOSIS — J01.91 ACUTE RECURRENT SINUSITIS, UNSPECIFIED LOCATION: ICD-10-CM

## 2019-04-08 DIAGNOSIS — J20.9 BRONCHITIS, ACUTE, WITH BRONCHOSPASM: Primary | ICD-10-CM

## 2019-04-08 DIAGNOSIS — F41.1 ANXIETY AS ACUTE REACTION TO EXCEPTIONAL STRESS: ICD-10-CM

## 2019-04-08 DIAGNOSIS — F43.0 ANXIETY AS ACUTE REACTION TO EXCEPTIONAL STRESS: ICD-10-CM

## 2019-04-08 RX ORDER — PREDNISONE 10 MG/1
TABLET ORAL
COMMUNITY
Start: 2019-01-04 | End: 2019-04-08 | Stop reason: ALTCHOICE

## 2019-04-08 RX ORDER — CEFDINIR 300 MG/1
300 CAPSULE ORAL 2 TIMES DAILY
Qty: 20 CAP | Refills: 0 | Status: SHIPPED | OUTPATIENT
Start: 2019-04-08 | End: 2019-04-18

## 2019-04-08 RX ORDER — IPRATROPIUM BROMIDE 21 UG/1
SPRAY, METERED NASAL
COMMUNITY
Start: 2019-03-09 | End: 2019-06-17 | Stop reason: SDUPTHER

## 2019-04-08 RX ORDER — PREDNISONE 10 MG/1
TABLET ORAL
Qty: 21 TAB | Refills: 0 | Status: SHIPPED | OUTPATIENT
Start: 2019-04-08 | End: 2019-04-09 | Stop reason: ALTCHOICE

## 2019-04-08 RX ORDER — BENZONATATE 200 MG/1
200 CAPSULE ORAL
Qty: 30 CAP | Refills: 0 | Status: SHIPPED | OUTPATIENT
Start: 2019-04-08 | End: 2019-04-15

## 2019-04-08 NOTE — PROGRESS NOTES
HISTORY OF PRESENT ILLNESS  Miko Mena is a 76 y.o. female. HPI  Cough productive yellow sputum since Thursday    Corcidin HBP, Robitussin ineffective    Low grade temp     Recurrent sinusitis    She is not allergic to Wellbutrin wants this removed form chart     Severe stressors, needs to find new place to live in several weeks. Unable to find affordable housing. Has been asked to leave current residence. Past Medical History:   Diagnosis Date    Arthritis     Carpal tunnel syndrome, bilateral     Depression     H/O colonoscopy 2011    no polyps, + diverticulosis and nonbleeding internal hemorroids    Hypercholesterolemia     Hypertension     Lyme disease 2007    Menopause 1999    Osteoporosis 11/17/2016    Thyroid disease     Trochanteric bursitis        Current Outpatient Medications on File Prior to Visit   Medication Sig Dispense Refill    pantoprazole (PROTONIX) 40 mg tablet TAKE 1 TABLET EVERY DAY 90 Tab 3    fenofibrate (LOFIBRA) 160 mg tablet TAKE 1 TABLET EVERY DAY 90 Tab 3    sertraline (ZOLOFT) 100 mg tablet TAKE 2 TABLETS EVERY  Tab 1    amLODIPine (NORVASC) 10 mg tablet TAKE 1 TABLET EVERY DAY 90 Tab 1    traZODone (DESYREL) 50 mg tablet TAKE 2 TABLETS EVERY NIGHT 180 Tab 3    ascorbate calcium (VITAMIN C PO) Take 1 Cap by mouth daily.  ibandronate (BONIVA) 150 mg tablet TAKE 1 TABLET (150 MG) EVERY THIRTY (30) DAYS 3 Tab 3    lisinopril (PRINIVIL, ZESTRIL) 20 mg tablet TAKE 1 TABLET EVERY DAY 90 Tab 3    multivitamin with minerals (HAIR,SKIN AND NAILS PO) Take 2 Caps by mouth daily.  fluticasone (FLONASE) 50 mcg/actuation nasal spray 2 Sprays by Both Nostrils route daily. 3 Bottle 3    rosuvastatin (CRESTOR) 20 mg tablet TAKE 1 TABLET EVERY NIGHT 90 Tab 3    multivitamin (ONE A DAY) tablet Take 1 Tab by mouth daily.  CALCIUM CARBONATE/VITAMIN D3 (CALCIUM + D PO) Take 1 Cap by mouth daily.       aspirin delayed-release 81 mg tablet Take 1 Tab by mouth daily. 90 Tab 3    levothyroxine (SYNTHROID) 88 mcg tablet TAKE 1 TABLET DAILY MOST DAYS AND 1 & 1/2 TABLETS DAILY ON SUNDAYS (7.5 TABLETS TOTAL/WEEK = 94 MCG/DAY AVERAGE DOSE) 98 Tab 3    magnesium oxide (MAG-OX) 400 mg tablet Take 1 Tab by mouth daily. 90 Tab 1    ipratropium (ATROVENT) 0.03 % nasal spray        No current facility-administered medications on file prior to visit. Allergies   Allergen Reactions    Mold Sneezing     Review of Systems   Constitutional: Negative. HENT: Positive for congestion and sinus pain. Negative for sore throat. Eyes: Negative. Respiratory: Positive for cough and shortness of breath. Cardiovascular: Negative. Gastrointestinal: Negative. Genitourinary: Negative. Neurological: Negative. Visit Vitals  /75 (BP 1 Location: Left arm, BP Patient Position: Sitting)   Pulse 76   Temp 98 °F (36.7 °C) (Oral)   Resp 19   Ht 5' (1.524 m)   Wt 134 lb 6.4 oz (61 kg)   SpO2 95%   BMI 26.25 kg/m²     Physical Exam   Constitutional: She is oriented to person, place, and time. She appears well-developed and well-nourished. No distress. Cardiovascular: Normal rate and regular rhythm. Pulmonary/Chest: Effort normal and breath sounds normal.   Neurological: She is alert and oriented to person, place, and time. No cranial nerve deficit. Skin: Skin is warm and dry. She is not diaphoretic. Psychiatric: Judgment and thought content normal. Her mood appears anxious. Her speech is rapid and/or pressured and tangential. Cognition and memory are normal.       ASSESSMENT and PLAN    ICD-10-CM ICD-9-CM    1. Bronchitis, acute, with bronchospasm J20.9 466.0 cefdinir (OMNICEF) 300 mg capsule      benzonatate (TESSALON) 200 mg capsule      DISCONTINUED: predniSONE (STERAPRED DS) 10 mg dose pack   2. Acute recurrent sinusitis, unspecified location J01.91 461.9 cefdinir (OMNICEF) 300 mg capsule      DISCONTINUED: predniSONE (STERAPRED DS) 10 mg dose pack   3. Anxiety as acute reaction to exceptional stress F41.1 308.0     F43.0       Follow-up and Dispositions    · Return if symptoms worsen or fail to improve. reviewed medications and side effects in detail    Discussed supportive care measures, indications for call back    Encouraged stress reduction     Patient voices understanding and acceptance of this advice and will call back if any further questions or concerns. An After Visit Summary was printed and given to the patient.

## 2019-04-08 NOTE — PATIENT INSTRUCTIONS
Bronchitis: Care Instructions  Your Care Instructions    Bronchitis is inflammation of the bronchial tubes, which carry air to the lungs. The tubes swell and produce mucus, or phlegm. The mucus and inflamed bronchial tubes make you cough. You may have trouble breathing. Most cases of bronchitis are caused by viruses like those that cause colds. Antibiotics usually do not help and they may be harmful. Bronchitis usually develops rapidly and lasts about 2 to 3 weeks in otherwise healthy people. Follow-up care is a key part of your treatment and safety. Be sure to make and go to all appointments, and call your doctor if you are having problems. It's also a good idea to know your test results and keep a list of the medicines you take. How can you care for yourself at home? · Take all medicines exactly as prescribed. Call your doctor if you think you are having a problem with your medicine. · Get some extra rest.  · Take an over-the-counter pain medicine, such as acetaminophen (Tylenol), ibuprofen (Advil, Motrin), or naproxen (Aleve) to reduce fever and relieve body aches. Read and follow all instructions on the label. · Do not take two or more pain medicines at the same time unless the doctor told you to. Many pain medicines have acetaminophen, which is Tylenol. Too much acetaminophen (Tylenol) can be harmful. · Take an over-the-counter cough medicine that contains dextromethorphan to help quiet a dry, hacking cough so that you can sleep. Avoid cough medicines that have more than one active ingredient. Read and follow all instructions on the label. · Breathe moist air from a humidifier, hot shower, or sink filled with hot water. The heat and moisture will thin mucus so you can cough it out. · Do not smoke. Smoking can make bronchitis worse. If you need help quitting, talk to your doctor about stop-smoking programs and medicines. These can increase your chances of quitting for good.   When should you call for help? Call 911 anytime you think you may need emergency care. For example, call if:    · You have severe trouble breathing.    Call your doctor now or seek immediate medical care if:    · You have new or worse trouble breathing.     · You cough up dark brown or bloody mucus (sputum).     · You have a new or higher fever.     · You have a new rash.    Watch closely for changes in your health, and be sure to contact your doctor if:    · You cough more deeply or more often, especially if you notice more mucus or a change in the color of your mucus.     · You are not getting better as expected. Where can you learn more? Go to http://shivam-shekhar.info/. Enter H333 in the search box to learn more about \"Bronchitis: Care Instructions. \"  Current as of: September 5, 2018  Content Version: 11.9  © 6758-8322 [employer]. Care instructions adapted under license by [employer] (which disclaims liability or warranty for this information). If you have questions about a medical condition or this instruction, always ask your healthcare professional. [address] any warranty or liability for your use of this information. Sinusitis: Care Instructions  Your Care Instructions    Sinusitis is an infection of the lining of the sinus cavities in your head. Sinusitis often follows a cold. It causes pain and pressure in your head and face. In most cases, sinusitis gets better on its own in 1 to 2 weeks. But some mild symptoms may last for several weeks. Sometimes antibiotics are needed. Follow-up care is a key part of your treatment and safety. Be sure to make and go to all appointments, and call your doctor if you are having problems. It's also a good idea to know your test results and keep a list of the medicines you take. How can you care for yourself at home?   · Take an over-the-counter pain medicine, such as acetaminophen (Tylenol), ibuprofen (Advil, Motrin), or naproxen (Aleve). Read and follow all instructions on the label. · If the doctor prescribed antibiotics, take them as directed. Do not stop taking them just because you feel better. You need to take the full course of antibiotics. · Be careful when taking over-the-counter cold or flu medicines and Tylenol at the same time. Many of these medicines have acetaminophen, which is Tylenol. Read the labels to make sure that you are not taking more than the recommended dose. Too much acetaminophen (Tylenol) can be harmful. · Breathe warm, moist air from a steamy shower, a hot bath, or a sink filled with hot water. Avoid cold, dry air. Using a humidifier in your home may help. Follow the directions for cleaning the machine. · Use saline (saltwater) nasal washes to help keep your nasal passages open and wash out mucus and bacteria. You can buy saline nose drops at a grocery store or drugstore. Or you can make your own at home by adding 1 teaspoon of salt and 1 teaspoon of baking soda to 2 cups of distilled water. If you make your own, fill a bulb syringe with the solution, insert the tip into your nostril, and squeeze gently. Fonnie Gaffney your nose. · Put a hot, wet towel or a warm gel pack on your face 3 or 4 times a day for 5 to 10 minutes each time. · Try a decongestant nasal spray like oxymetazoline (Afrin). Do not use it for more than 3 days in a row. Using it for more than 3 days can make your congestion worse. When should you call for help? Call your doctor now or seek immediate medical care if:    · You have new or worse swelling or redness in your face or around your eyes.     · You have a new or higher fever.    Watch closely for changes in your health, and be sure to contact your doctor if:    · You have new or worse facial pain.     · The mucus from your nose becomes thicker (like pus) or has new blood in it.     · You are not getting better as expected. Where can you learn more?   Go to http://shivam-shekhar.info/. Enter U899 in the search box to learn more about \"Sinusitis: Care Instructions. \"  Current as of: March 27, 2018  Content Version: 11.9  © 0479-2377 [employer]. Care instructions adapted under license by [employer] (which disclaims liability or warranty for this information). If you have questions about a medical condition or this instruction, always ask your healthcare professional. [place] [address] any warranty or liability for your use of this information.

## 2019-04-08 NOTE — PROGRESS NOTES
Exam room 9  [facility] is a 76 y.o. female   Chief Complaint   Patient presents with    Cough     Visit Vitals  /75 (BP 1 Location: Left arm, BP Patient Position: Sitting)   Pulse 76   Temp 98 °F (36.7 °C) (Oral)   Resp 19   Ht 5' (1.524 m)   Wt 134 lb 6.4 oz (61 kg)   SpO2 93%   BMI 26.25 kg/m²     1. Have you been to the ER, urgent care clinic since your last visit? Hospitalized since your last visit? No    2. Have you seen or consulted any other health care providers outside of the [address] [place] [address] since your last visit? Include any pap smears or colon screening.  No  Health Maintenance Due   Topic Date Due    Shingrix Vaccine Age 49> (1 of 2) 11/22/2000     Learning Assessment 1/9/2018   PRIMARY LEARNER Patient   HIGHEST LEVEL OF EDUCATION - PRIMARY LEARNER  GRADUATED HIGH SCHOOL OR GED   BARRIERS PRIMARY LEARNER NONE   CO-LEARNER CAREGIVER No   PRIMARY LANGUAGE ENGLISH   LEARNER PREFERENCE PRIMARY READING   ANSWERED BY patient   RELATIONSHIP SELF

## 2019-04-09 RX ORDER — LANOLIN ALCOHOL/MO/W.PET/CERES
400 CREAM (GRAM) TOPICAL DAILY
Qty: 90 TAB | Refills: 1 | Status: SHIPPED | OUTPATIENT
Start: 2019-04-09

## 2019-04-09 RX ORDER — LEVOTHYROXINE SODIUM 88 UG/1
TABLET ORAL
Qty: 98 TAB | Refills: 3 | Status: SHIPPED | OUTPATIENT
Start: 2019-04-09 | End: 2020-01-10

## 2019-05-02 ENCOUNTER — HOSPITAL ENCOUNTER (OUTPATIENT)
Dept: MAMMOGRAPHY | Age: 69
Discharge: HOME | End: 2019-05-02
Payer: MEDICARE

## 2019-05-02 DIAGNOSIS — Z12.39 SCREENING BREAST EXAMINATION: ICD-10-CM

## 2019-05-02 PROCEDURE — 77067 SCR MAMMO BI INCL CAD: CPT

## 2019-05-13 DIAGNOSIS — J30.0 CHRONIC VASOMOTOR RHINITIS: ICD-10-CM

## 2019-05-13 DIAGNOSIS — M81.0 OSTEOPOROSIS, UNSPECIFIED OSTEOPOROSIS TYPE, UNSPECIFIED PATHOLOGICAL FRACTURE PRESENCE: ICD-10-CM

## 2019-05-13 RX ORDER — LISINOPRIL 20 MG/1
TABLET ORAL
Qty: 90 TAB | Refills: 3 | Status: SHIPPED | OUTPATIENT
Start: 2019-05-13

## 2019-05-13 RX ORDER — IBANDRONATE SODIUM 150 MG/1
TABLET, FILM COATED ORAL
Qty: 3 TAB | Refills: 3 | Status: SHIPPED | OUTPATIENT
Start: 2019-05-13

## 2019-05-13 RX ORDER — FLUTICASONE PROPIONATE 50 MCG
SPRAY, SUSPENSION (ML) NASAL
Qty: 48 G | Refills: 3 | Status: SHIPPED | OUTPATIENT
Start: 2019-05-13 | End: 2019-06-17 | Stop reason: SINTOL

## 2019-06-05 DIAGNOSIS — E78.5 HYPERLIPIDEMIA, UNSPECIFIED HYPERLIPIDEMIA TYPE: ICD-10-CM

## 2019-06-05 RX ORDER — ROSUVASTATIN CALCIUM 20 MG/1
TABLET, COATED ORAL
Qty: 14 TAB | Refills: 0 | Status: SHIPPED | OUTPATIENT
Start: 2019-06-05 | End: 2019-06-13 | Stop reason: SDUPTHER

## 2019-06-05 NOTE — TELEPHONE ENCOUNTER
Patient request for medication be sent to [facility] located in [address] at # [phone]      Medication refill request:    Last Office Visit:  Monday, April 08, 2019  Next Office Visit:    Future Appointments   Date Time Provider [facility]   7/22/2019  3:10 PM Susy Chadwick MD RDE [address]   10/18/2019 11:00 AM Soheila Robin MD [facility] [address] [facility] verified.   Yes     Patient requesting for a 2 week supply

## 2019-06-17 ENCOUNTER — OFFICE VISIT (OUTPATIENT)
Dept: INTERNAL MEDICINE CLINIC | Age: 69
End: 2019-06-17

## 2019-06-17 VITALS
BODY MASS INDEX: 27.19 KG/M2 | DIASTOLIC BLOOD PRESSURE: 71 MMHG | OXYGEN SATURATION: 96 % | HEART RATE: 77 BPM | RESPIRATION RATE: 16 BRPM | TEMPERATURE: 98.3 F | HEIGHT: 60 IN | SYSTOLIC BLOOD PRESSURE: 131 MMHG | WEIGHT: 138.5 LBS

## 2019-06-17 DIAGNOSIS — J30.9 ALLERGIC SINUSITIS: Primary | ICD-10-CM

## 2019-06-17 RX ORDER — AZITHROMYCIN 250 MG/1
TABLET, FILM COATED ORAL
Qty: 6 TAB | Refills: 0 | Status: SHIPPED | OUTPATIENT
Start: 2019-06-17

## 2019-06-17 RX ORDER — BENZOCAINE .13; .15; .5; 2 G/100G; G/100G; G/100G; G/100G
2 GEL ORAL DAILY
Qty: 1 BOTTLE | Refills: 2 | Status: SHIPPED | OUTPATIENT
Start: 2019-06-17

## 2019-06-17 RX ORDER — IPRATROPIUM BROMIDE 21 UG/1
2 SPRAY, METERED NASAL
Qty: 30 ML | Refills: 2
Start: 2019-06-17

## 2019-06-17 NOTE — PATIENT INSTRUCTIONS
1.  Use Rhinocort instead of Flonase as reviewed. As directed, when you start the Rhinocort, take two sprays/nostril two times daily for the first 3 days, then use 2 sprays/nostril once daily after that. This will help the medication start working sooner. 2.  Use the ipratropium nasal spray 3 times daily as reviewed, until you have completed 48-72 hours of the antibiotic.

## 2019-06-17 NOTE — PROGRESS NOTES
RM 16    Patient reports having a cockatiel (bird), has concerns if coughing comes from being exposed to bird often. Chief Complaint   Patient presents with    Cough     occuring for about a week. Reports increased congestion. Denies sore throat      1. Have you been to the ER, urgent care clinic since your last visit? Hospitalized since your last visit? No    2. Have you seen or consulted any other health care providers outside of the [address] [place] [address] since your last visit? Include any pap smears or colon screening. No    Health Maintenance Due   Topic Date Due    Shingrix Vaccine Age 49> (1 of 2) 11/22/2000    GLAUCOMA SCREENING Q2Y  07/01/2019       Abuse Screening Questionnaire 6/17/2019   Do you ever feel afraid of your partner? N   Are you in a relationship with someone who physically or mentally threatens you? N   Is it safe for you to go home? Y     3 most recent PHQ Screens 6/17/2019   Little interest or pleasure in doing things More than half the days   Feeling down, depressed, irritable, or hopeless More than half the days   Total Score PHQ 2 4   Trouble falling or staying asleep, or sleeping too much Nearly every day   Feeling tired or having little energy Nearly every day   Poor appetite, weight loss, or overeating Nearly every day   Feeling bad about yourself - or that you are a failure or have let yourself or your family down More than half the days   Trouble concentrating on things such as school, work, reading, or watching TV More than half the days   Moving or speaking so slowly that other people could have noticed; or the opposite being so fidgety that others notice Not at all   Thoughts of being better off dead, or hurting yourself in some way Not at all   PHQ 9 Score 17     Fall Risk Assessment, last 12 mths 6/17/2019   Able to walk? Yes   Fall in past 12 months?  No         Learning Assessment 1/9/2018   PRIMARY LEARNER Patient   HIGHEST LEVEL OF EDUCATION - PRIMARY LEARNER GRADUATED HIGH SCHOOL OR GED   BARRIERS PRIMARY LEARNER NONE   CO-LEARNER CAREGIVER No   PRIMARY LANGUAGE ENGLISH   LEARNER PREFERENCE PRIMARY READING   ANSWERED BY patient   RELATIONSHIP SELF

## 2019-06-17 NOTE — PROGRESS NOTES
History of Present Illness:   Neal Romano is a 76 y.o. female here for evaluation:    Chief Complaint   Patient presents with    Cough     occuring for about a weak. Reports increased congestion. Denies sore throat      Notes:  Patient reports having a cocteal (bird) [cockatiel], has concerns if could be causing     She last had steroids here April 2019 with taper with Dr. Saran Zelaya. She was evaluated by Dr. Saran Zelaya for cough April 2019. Treated for recurrent sinusitis, bronchitis with cefdinir then. She notes cough resolved after therapy above. She notes new symptoms again last week. Had congestion and post-nasal drainage. [place] here. She notes wheezing through nose since cough last week. She notes cough had been better yesterday, but may be worse today. She notes Flonase has SE or nasal bleeding. Has used others in past, but doesn't know if caused nose bleeding. There are no records of Rhinocort or Nasocort scripts here. Reviewed alternative nasal steroid, avoiding PO steroids for this illness, and re-starting ipratropium nasal spray. She has ipratropium nasal spray, but not using regularly. Reviewed azithromycin for cough for atypical coverage. Nursing screenings reviewed by provider at visit. Vital signs, medical history (including PMH, History and Problem Lists), current medicines, allergies reviewed during visit. Prior to Admission medications    Medication Sig Start Date End Date Taking? Authorizing Provider   GINSENG PO Take 450 mg by mouth daily.    Yes Provider, Historical   rosuvastatin (CRESTOR) 20 mg tablet TAKE 1 TABLET EVERY NIGHT 6/13/19  Yes Grecia Helm MD   fluticasone propionate (FLONASE) 50 mcg/actuation nasal spray USE 2 SPRAYS IN EACH NOSTRIL EVERY DAY  Patient taking differently: USE 2 SPRAYS IN EACH NOSTRIL EVERY DAY prn 5/13/19  Yes Grecia Helm MD   ibandronate (BONIVA) 150 mg tablet TAKE 1 TABLET EVERY 30 DAYS 5/13/19 Yes Viri Grajeda MD   lisinopril (PRINIVIL, ZESTRIL) 20 mg tablet TAKE 1 TABLET EVERY DAY 5/13/19  Yes Viri Grajeda MD   linaclotide [facility]) 145 mcg cap capsule Take 1 Cap by mouth Daily (before breakfast). 4/25/19  Yes Viri Grajeda MD   levothyroxine (SYNTHROID) 88 mcg tablet TAKE 1 TABLET DAILY MOST DAYS AND 1 & 1/2 TABLETS DAILY ON SUNDAYS (7.5 TABLETS TOTAL/WEEK = 94 MCG/DAY AVERAGE DOSE) 4/9/19  Yes Viri Grajeda MD   magnesium oxide (MAG-OX) 400 mg tablet Take 1 Tab by mouth daily. 4/9/19  Yes Viri Grajeda MD   pantoprazole (PROTONIX) 40 mg tablet TAKE 1 TABLET EVERY DAY 3/10/19  Yes Viri Grajeda MD   fenofibrate (LOFIBRA) 160 mg tablet TAKE 1 TABLET EVERY DAY 3/10/19  Yes Viri Grajeda MD   sertraline (ZOLOFT) 100 mg tablet TAKE 2 TABLETS EVERY DAY 2/20/19  Yes Viri Grajeda MD   amLODIPine (NORVASC) 10 mg tablet TAKE 1 TABLET EVERY DAY 2/20/19  Yes Viri Grajeda MD   traZODone (DESYREL) 50 mg tablet TAKE 2 TABLETS EVERY NIGHT 10/14/18  Yes Viri Grajeda MD   ascorbate calcium (VITAMIN C PO) Take 1 Cap by mouth daily. Yes Provider, Historical   multivitamin with minerals (HAIR,SKIN AND NAILS PO) Take 2 Caps by mouth daily. Yes Provider, Historical   multivitamin (ONE A DAY) tablet Take 1 Tab by mouth daily. Yes Provider, Historical   CALCIUM CARBONATE/VITAMIN D3 (CALCIUM + D PO) Take 1 Cap by mouth daily. Yes Provider, Historical   aspirin delayed-release 81 mg tablet Take 1 Tab by mouth daily. 12/17/15  Yes Noman Guidry MD   predniSONE (DELTASONE) 10 mg tablet Taper daily.   60mg x1, 50mg x 1, 40mg x 1, 30mg x 1, 20mg x 1, 10mg x 1 4/17/19   Viri Grajeda MD   ipratropium (ATROVENT) 0.03 % nasal spray  3/9/19   Provider, Historical        ROS    Vitals:    06/17/19 1624   BP: 131/71   Pulse: 77   Resp: 16   Temp: 98.3 °F (36.8 °C)   TempSrc: Oral   SpO2: 96%   Weight: 138 lb 8 oz (62.8 kg)   Height: 5' (1.524 m)   PainSc:   0 - No pain      Body mass index is 27.05 kg/m². Physical Exam:     Physical Exam   Constitutional: She appears well-developed and well-nourished. No distress. HENT:   Head: Normocephalic and atraumatic. Right Ear: External ear normal.   Left Ear: External ear normal.   Mouth/Throat: Oropharynx is clear and moist.   Moderate proximal nasopharyngeal edema present bilaterally with slight amount clear discharge bilat. Right canal with moderate wax--not well-visualized. Left TM normal.   Eyes: Conjunctivae are normal. Right eye exhibits no discharge. Left eye exhibits no discharge. No scleral icterus. Neck: Neck supple. Cardiovascular: Normal rate, regular rhythm, normal heart sounds and intact distal pulses. Exam reveals no gallop and no friction rub. No murmur heard. Pulmonary/Chest: Effort normal and breath sounds normal. No respiratory distress. She has no wheezes. She has no rales. She exhibits no tenderness. No forced expiratory wheezes noted bilaterally. Abdominal: Soft. Bowel sounds are normal. She exhibits no distension. There is no tenderness. Musculoskeletal: She exhibits no edema or tenderness. Neurological: She is alert. She exhibits normal muscle tone. Coordination normal.   Skin: Skin is warm. No rash noted. She is not diaphoretic. No erythema. No pallor. Psychiatric: She has a normal mood and affect. Her behavior is normal. Judgment and thought content normal.       Assessment and Plan:       ICD-10-CM ICD-9-CM    1. Allergic sinusitis J30.9 477.9 budesonide (RHINOCORT AQUA) 32 mcg/actuation nasal spray      ipratropium (ATROVENT) 0.03 % nasal spray      azithromycin (ZITHROMAX) 250 mg tablet       Medication(s), management and follow-up based on response reviewed at visit. Follow-up and Dispositions    · Return if symptoms worsen or fail to improve.        reviewed medications and side effects in detail    For additional documentation of information and/or recommendations discussed this visit, please see notes in instructions. Plan and evaluation (above) reviewed with pt at visit  Patient voiced understanding of plan and provided with time to ask/review questions. After Visit Summary (AVS) provided to pt after visit with additional instructions as needed/reviewed.

## 2019-06-24 ENCOUNTER — TELEPHONE (OUTPATIENT)
Dept: INTERNAL MEDICINE CLINIC | Age: 69
End: 2019-06-24

## 2019-06-24 NOTE — TELEPHONE ENCOUNTER
Pt seen 6/17/19 by Dr Karie Delacruz, pt has finished Z pack. Pt still having a lot of coughing and is still stopped up. Pt states she is better but is 'not well'. Pt states that Dr Leonel Palmer normally Rxs prednisone which helps clear everything up. Pt would like Rx for prednisone sent to [facility] [place] [facility], (on file). If not Prednisone, than pt request an alternative med.   Pt ph: [phone]

## 2019-06-25 RX ORDER — PREDNISONE 10 MG/1
TABLET ORAL
Qty: 21 TAB | Refills: 0 | Status: SHIPPED | OUTPATIENT
Start: 2019-06-25

## 2019-06-25 NOTE — TELEPHONE ENCOUNTER
Pt script sent to pharmacy as requested for prednisone. If not improved, can review again in clinic. This is same tapering dose Dr. Rasheed Dimas used previously in April 2019. [employer] message to pt regarding above.

## 2019-07-01 DIAGNOSIS — F32.A ANXIETY AND DEPRESSION: ICD-10-CM

## 2019-07-01 DIAGNOSIS — F41.9 ANXIETY AND DEPRESSION: ICD-10-CM

## 2019-07-08 RX ORDER — AMLODIPINE BESYLATE 10 MG/1
TABLET ORAL
Qty: 90 TAB | Refills: 1 | Status: SHIPPED | OUTPATIENT
Start: 2019-07-08 | End: 2020-01-10

## 2019-07-08 RX ORDER — SERTRALINE HYDROCHLORIDE 100 MG/1
TABLET, FILM COATED ORAL
Qty: 180 TAB | Refills: 1 | Status: SHIPPED | OUTPATIENT
Start: 2019-07-08 | End: 2019-12-27

## 2019-08-22 LAB
T4 FREE SERPL-MCNC: 1.52 NG/DL (ref 0.82–1.77)
THYROPEROXIDASE AB SERPL-ACNC: 26 IU/ML (ref 0–34)
TSH RECEP AB SER-ACNC: <1.1 IU/L (ref 0–1.75)
TSH SERPL DL<=0.005 MIU/L-ACNC: 0.74 UIU/ML (ref 0.45–4.5)

## 2019-08-27 ENCOUNTER — TELEPHONE (OUTPATIENT)
Dept: ENDOCRINOLOGY | Age: 69
End: 2019-08-27

## 2019-08-27 NOTE — TELEPHONE ENCOUNTER
8/27/2019  9:07 AM      Ms. Dain Quintanilla would like to know her lab results asap.           Thanks

## 2019-08-27 NOTE — TELEPHONE ENCOUNTER
Spoke with pt and after verifying her name and , she was made aware that her thyroid lab results were within range. Pt stated that she is concern because she is still experiencing hair loss and is very fatigue.

## 2019-08-29 NOTE — TELEPHONE ENCOUNTER
With how her labs look (normal and at goal), I suspect her symptoms must be due to a non-thyroid related cause.

## 2019-09-03 RX ORDER — TRAZODONE HYDROCHLORIDE 50 MG/1
TABLET ORAL
Qty: 180 TAB | Refills: 3 | Status: SHIPPED | OUTPATIENT
Start: 2019-09-03

## 2019-12-26 DIAGNOSIS — F32.A ANXIETY AND DEPRESSION: ICD-10-CM

## 2019-12-26 DIAGNOSIS — F41.9 ANXIETY AND DEPRESSION: ICD-10-CM

## 2019-12-28 RX ORDER — SERTRALINE HYDROCHLORIDE 100 MG/1
TABLET, FILM COATED ORAL
Qty: 180 TAB | Refills: 1 | Status: SHIPPED | OUTPATIENT
Start: 2019-12-28

## 2019-12-30 RX ORDER — BENZONATATE 200 MG/1
CAPSULE ORAL
Qty: 30 CAP | Refills: 3 | Status: SHIPPED | OUTPATIENT
Start: 2019-12-30

## 2019-12-31 NOTE — TELEPHONE ENCOUNTER
Informed pt that her medication was approved, when advising pt that she needed to make an appt the pt then stated that she no longer live's in [place].

## 2020-01-10 DIAGNOSIS — E03.4 HYPOTHYROIDISM DUE TO ACQUIRED ATROPHY OF THYROID: ICD-10-CM

## 2020-01-10 RX ORDER — FENOFIBRATE 160 MG/1
TABLET ORAL
Qty: 90 TAB | Refills: 3 | Status: SHIPPED | OUTPATIENT
Start: 2020-01-10

## 2020-01-10 RX ORDER — AMLODIPINE BESYLATE 10 MG/1
TABLET ORAL
Qty: 90 TAB | Refills: 1 | Status: SHIPPED | OUTPATIENT
Start: 2020-01-10

## 2020-01-10 RX ORDER — LEVOTHYROXINE SODIUM 88 UG/1
TABLET ORAL
Qty: 98 TAB | Refills: 3 | Status: SHIPPED | OUTPATIENT
Start: 2020-01-10

## 2020-05-15 RX ORDER — FLUTICASONE PROPIONATE 50 MCG
SPRAY, SUSPENSION (ML) NASAL
Qty: 48 G | Refills: 0 | Status: SHIPPED | OUTPATIENT
Start: 2020-05-15

## 2022-03-19 PROBLEM — J30.0 CHRONIC VASOMOTOR RHINITIS: Status: ACTIVE | Noted: 2018-01-09

## 2022-03-20 PROBLEM — F33.9 RECURRENT DEPRESSION (HCC): Status: ACTIVE | Noted: 2018-05-10

## 2023-05-19 RX ORDER — IPRATROPIUM BROMIDE 21 UG/1
2 SPRAY, METERED NASAL EVERY 8 HOURS PRN
COMMUNITY
Start: 2019-06-17

## 2023-05-19 RX ORDER — PANTOPRAZOLE SODIUM 40 MG/1
1 TABLET, DELAYED RELEASE ORAL DAILY
COMMUNITY
Start: 2019-03-10

## 2023-05-19 RX ORDER — FENOFIBRATE 160 MG/1
1 TABLET ORAL DAILY
COMMUNITY
Start: 2020-01-10

## 2023-05-19 RX ORDER — IBANDRONATE SODIUM 150 MG/1
TABLET, FILM COATED ORAL
COMMUNITY
Start: 2019-05-13

## 2023-05-19 RX ORDER — BENZONATATE 200 MG/1
CAPSULE ORAL
COMMUNITY
Start: 2019-12-30

## 2023-05-19 RX ORDER — TRAZODONE HYDROCHLORIDE 50 MG/1
TABLET ORAL
COMMUNITY
Start: 2019-09-03

## 2023-05-19 RX ORDER — AZITHROMYCIN 250 MG/1
TABLET, FILM COATED ORAL
COMMUNITY
Start: 2019-06-17

## 2023-05-19 RX ORDER — LEVOTHYROXINE SODIUM 88 UG/1
TABLET ORAL
COMMUNITY
Start: 2020-01-10

## 2023-05-19 RX ORDER — AMLODIPINE BESYLATE 10 MG/1
1 TABLET ORAL DAILY
COMMUNITY
Start: 2020-01-10

## 2023-05-19 RX ORDER — FLUTICASONE PROPIONATE 50 MCG
SPRAY, SUSPENSION (ML) NASAL
COMMUNITY
Start: 2020-05-15

## 2023-05-19 RX ORDER — SERTRALINE HYDROCHLORIDE 100 MG/1
TABLET, FILM COATED ORAL
COMMUNITY
Start: 2019-12-28

## 2023-05-19 RX ORDER — ASPIRIN 81 MG/1
81 TABLET ORAL DAILY
COMMUNITY
Start: 2015-12-17

## 2023-05-19 RX ORDER — PREDNISONE 10 MG/1
TABLET ORAL
COMMUNITY
Start: 2019-06-25

## 2023-05-19 RX ORDER — LISINOPRIL 20 MG/1
1 TABLET ORAL DAILY
COMMUNITY
Start: 2019-05-13

## 2023-05-19 RX ORDER — ROSUVASTATIN CALCIUM 20 MG/1
1 TABLET, COATED ORAL NIGHTLY
COMMUNITY
Start: 2019-06-13

## 2023-05-19 RX ORDER — MAGNESIUM OXIDE 400 MG/1
400 TABLET ORAL DAILY
COMMUNITY
Start: 2019-04-09